# Patient Record
Sex: FEMALE | Race: WHITE | NOT HISPANIC OR LATINO | Employment: PART TIME | URBAN - NONMETROPOLITAN AREA
[De-identification: names, ages, dates, MRNs, and addresses within clinical notes are randomized per-mention and may not be internally consistent; named-entity substitution may affect disease eponyms.]

---

## 2017-02-21 ENCOUNTER — OFFICE VISIT (OUTPATIENT)
Dept: GASTROENTEROLOGY | Facility: CLINIC | Age: 62
End: 2017-02-21

## 2017-02-21 VITALS
HEART RATE: 61 BPM | DIASTOLIC BLOOD PRESSURE: 81 MMHG | HEIGHT: 60 IN | WEIGHT: 169.2 LBS | BODY MASS INDEX: 33.22 KG/M2 | SYSTOLIC BLOOD PRESSURE: 122 MMHG

## 2017-02-21 DIAGNOSIS — R10.13 EPIGASTRIC PAIN: Primary | ICD-10-CM

## 2017-02-21 PROCEDURE — 99204 OFFICE O/P NEW MOD 45 MIN: CPT | Performed by: NURSE PRACTITIONER

## 2017-02-21 RX ORDER — DEXTROSE AND SODIUM CHLORIDE 5; .45 G/100ML; G/100ML
30 INJECTION, SOLUTION INTRAVENOUS CONTINUOUS PRN
Status: CANCELLED | OUTPATIENT
Start: 2017-02-21

## 2017-03-02 RX ORDER — MELATONIN
1000 DAILY
Status: ON HOLD | COMMUNITY
End: 2017-03-09

## 2017-03-09 ENCOUNTER — HOSPITAL ENCOUNTER (OUTPATIENT)
Facility: HOSPITAL | Age: 62
Setting detail: HOSPITAL OUTPATIENT SURGERY
Discharge: HOME | End: 2017-03-09
Attending: INTERNAL MEDICINE | Admitting: INTERNAL MEDICINE

## 2017-03-09 ENCOUNTER — ANESTHESIA EVENT (OUTPATIENT)
Dept: GASTROENTEROLOGY | Facility: HOSPITAL | Age: 62
End: 2017-03-09

## 2017-03-09 ENCOUNTER — ANESTHESIA (OUTPATIENT)
Dept: GASTROENTEROLOGY | Facility: HOSPITAL | Age: 62
End: 2017-03-09

## 2017-03-09 VITALS
RESPIRATION RATE: 16 BRPM | BODY MASS INDEX: 33.07 KG/M2 | TEMPERATURE: 97.6 F | WEIGHT: 168.43 LBS | SYSTOLIC BLOOD PRESSURE: 114 MMHG | HEART RATE: 88 BPM | DIASTOLIC BLOOD PRESSURE: 71 MMHG | HEIGHT: 60 IN | OXYGEN SATURATION: 96 %

## 2017-03-09 DIAGNOSIS — R10.13 EPIGASTRIC PAIN: ICD-10-CM

## 2017-03-09 PROCEDURE — 88305 TISSUE EXAM BY PATHOLOGIST: CPT | Performed by: PATHOLOGY

## 2017-03-09 PROCEDURE — 25010000002 MIDAZOLAM PER 1 MG: Performed by: NURSE ANESTHETIST, CERTIFIED REGISTERED

## 2017-03-09 PROCEDURE — 25010000002 FENTANYL CITRATE (PF) 100 MCG/2ML SOLUTION: Performed by: NURSE ANESTHETIST, CERTIFIED REGISTERED

## 2017-03-09 PROCEDURE — 88342 IMHCHEM/IMCYTCHM 1ST ANTB: CPT | Performed by: INTERNAL MEDICINE

## 2017-03-09 PROCEDURE — 25010000002 PROPOFOL 10 MG/ML EMULSION: Performed by: NURSE ANESTHETIST, CERTIFIED REGISTERED

## 2017-03-09 PROCEDURE — 43239 EGD BIOPSY SINGLE/MULTIPLE: CPT | Performed by: INTERNAL MEDICINE

## 2017-03-09 PROCEDURE — 88342 IMHCHEM/IMCYTCHM 1ST ANTB: CPT | Performed by: PATHOLOGY

## 2017-03-09 PROCEDURE — 88305 TISSUE EXAM BY PATHOLOGIST: CPT | Performed by: INTERNAL MEDICINE

## 2017-03-09 RX ORDER — FENTANYL CITRATE 50 UG/ML
INJECTION, SOLUTION INTRAMUSCULAR; INTRAVENOUS AS NEEDED
Status: DISCONTINUED | OUTPATIENT
Start: 2017-03-09 | End: 2017-03-09 | Stop reason: SURG

## 2017-03-09 RX ORDER — MIDAZOLAM HYDROCHLORIDE 1 MG/ML
INJECTION INTRAMUSCULAR; INTRAVENOUS AS NEEDED
Status: DISCONTINUED | OUTPATIENT
Start: 2017-03-09 | End: 2017-03-09 | Stop reason: SURG

## 2017-03-09 RX ORDER — DEXTROSE AND SODIUM CHLORIDE 5; .45 G/100ML; G/100ML
30 INJECTION, SOLUTION INTRAVENOUS CONTINUOUS PRN
Status: DISCONTINUED | OUTPATIENT
Start: 2017-03-09 | End: 2017-03-09 | Stop reason: HOSPADM

## 2017-03-09 RX ORDER — SODIUM CHLORIDE, SODIUM GLUCONATE, SODIUM ACETATE, POTASSIUM CHLORIDE, AND MAGNESIUM CHLORIDE 526; 502; 368; 37; 30 MG/100ML; MG/100ML; MG/100ML; MG/100ML; MG/100ML
INJECTION, SOLUTION INTRAVENOUS CONTINUOUS PRN
Status: DISCONTINUED | OUTPATIENT
Start: 2017-03-09 | End: 2017-03-09 | Stop reason: SURG

## 2017-03-09 RX ORDER — PROPOFOL 10 MG/ML
VIAL (ML) INTRAVENOUS AS NEEDED
Status: DISCONTINUED | OUTPATIENT
Start: 2017-03-09 | End: 2017-03-09 | Stop reason: SURG

## 2017-03-09 RX ORDER — LIDOCAINE HYDROCHLORIDE 10 MG/ML
INJECTION, SOLUTION INFILTRATION; PERINEURAL AS NEEDED
Status: DISCONTINUED | OUTPATIENT
Start: 2017-03-09 | End: 2017-03-09 | Stop reason: SURG

## 2017-03-09 RX ADMIN — FENTANYL CITRATE 100 MCG: 50 INJECTION, SOLUTION INTRAMUSCULAR; INTRAVENOUS at 09:31

## 2017-03-09 RX ADMIN — PROPOFOL 30 MG: 10 INJECTION, EMULSION INTRAVENOUS at 09:37

## 2017-03-09 RX ADMIN — PROPOFOL 20 MG: 10 INJECTION, EMULSION INTRAVENOUS at 09:38

## 2017-03-09 RX ADMIN — LIDOCAINE HYDROCHLORIDE 80 MG: 10 INJECTION, SOLUTION INFILTRATION; PERINEURAL at 09:31

## 2017-03-09 RX ADMIN — MIDAZOLAM 2 MG: 1 INJECTION INTRAMUSCULAR; INTRAVENOUS at 09:30

## 2017-03-09 RX ADMIN — DEXTROSE AND SODIUM CHLORIDE 30 ML/HR: 5; 450 INJECTION, SOLUTION INTRAVENOUS at 08:57

## 2017-03-09 RX ADMIN — SODIUM CHLORIDE, SODIUM GLUCONATE, SODIUM ACETATE, POTASSIUM CHLORIDE, AND MAGNESIUM CHLORIDE: 526; 502; 368; 37; 30 INJECTION, SOLUTION INTRAVENOUS at 09:22

## 2017-03-09 NOTE — PLAN OF CARE
Problem: Patient Care Overview (Adult)  Goal: Plan of Care Review  Outcome: Ongoing (interventions implemented as appropriate)    03/09/17 0947   Coping/Psychosocial Response Interventions   Plan Of Care Reviewed With patient   Patient Care Overview   Progress no change   Outcome Evaluation   Outcome Summary/Follow up Plan vss         Problem: GI Endoscopy (Adult)  Goal: Signs and Symptoms of Listed Potential Problems Will be Absent or Manageable (GI Endoscopy)  Outcome: Ongoing (interventions implemented as appropriate)    03/09/17 0947   GI Endoscopy   Problems Assessed (GI Endoscopy) all   Problems Present (GI Endoscopy) none

## 2017-03-09 NOTE — ANESTHESIA PREPROCEDURE EVALUATION
Anesthesia Evaluation     Patient summary reviewed and Nursing notes reviewed      Airway   Mallampati: II  TM distance: <3 FB  Neck ROM: full  possible difficult intubation  Dental      Pulmonary - negative pulmonary ROS and normal exam   (-) asthma, recent URI  Cardiovascular - normal exam    (+) hypertension well controlled,       Neuro/Psych  (+) dizziness/light headedness (Vertigo),    GI/Hepatic/Renal/Endo    (+) obesity,    (-) diabetes    Musculoskeletal (-) negative ROS    Abdominal  - normal exam   Substance History - negative use     OB/GYN negative ob/gyn ROS         Other - negative ROS                                   Anesthesia Plan    ASA 2     MAC     intravenous induction   Anesthetic plan and risks discussed with patient.

## 2017-03-09 NOTE — H&P (VIEW-ONLY)
Chief Complaint   Patient presents with   • Abdominal Pain       Subjective    Gabriela Celaya is a 62 y.o. female. she is being seen for consultation today at the request of Dr. Jesus  Abdominal Pain   This is a recurrent problem. The current episode started 1 to 4 weeks ago. The onset quality is gradual. The problem occurs intermittently. The pain is located in the epigastric region. The pain is mild. The quality of the pain is aching. The abdominal pain does not radiate. Associated symptoms include diarrhea. Pertinent negatives include no arthralgias, constipation, fever, flatus, hematochezia, melena, nausea, vomiting or weight loss. Associated symptoms comments: chills. The pain is relieved by bowel movements. The treatment provided moderate relief. There is no history of abdominal surgery, colon cancer, gallstones, GERD, pancreatitis or ulcerative colitis.     Patient presents to discuss severe epigastric pain.  States it's improved currently.  However reports she'll have intermittent attacks with severe pain.  Cannot pinpoint any certain intake or activity.  Denies any current nausea vomiting.  States she will have diarrhea in the early morning.  Denies any melena or hematochezia.  States she's been on Nexium which has helped some.  Had an ultrasound of her abdomen which noted contracted gallbladder with no stones or pericholecystic fluid.  states whenever attacks occur they will last about 4-5 hours described the pain is dull and sharp.  Weight is up about 8 pounds.  Plan; schedule HIDA scan and EGD to evaluate.  Follow-up after test.      The following portions of the patient's history were reviewed and updated as appropriate:   Past Medical History   Diagnosis Date   • Acute bronchitis    • Acute pharyngitis    • Allergic asthma      IgE-mediated allergic asthma      • Allergic rhinitis    • Asthmatic bronchitis    • Cough    • Diabetes mellitus screening    • Dizziness and giddiness    • Encounter for  long-term current use of medication    • Essential hypertension    • Fatigue    • History of bone density study      DEXA (bone density) test, peripheral   • History of mammography, diagnostic    • Nuclear senile cataract    • Otalgia    • Shoulder pain      bursitis vs tendonitis      • Sprain of ankle, left    • Upper respiratory infection    • Vertigo    • Wheezing      Past Surgical History   Procedure Laterality Date   • Pelvic fracture surgery  1973     Anesth, repair fracture, pelvis   • Cardiac catheterization  04/16/2008     Cardiac cath (negative)   • Injection of medication  03/05/2013   • Pap smear     • Tubal abdominal ligation       Family History   Problem Relation Age of Onset   • Asthma Mother    • Diabetes Mother    • Stroke Mother      cva in her 50's   • Cancer Father    • Diabetes Father    • Heart attack Father      mi in 50's   • No Known Problems Son      OB History     No data available        Current Outpatient Prescriptions   Medication Sig Dispense Refill   • aspirin 81 MG tablet Take 81 mg by mouth Daily.     • lisinopril (PRINIVIL,ZESTRIL) 10 MG tablet Take 10 mg by mouth Daily.     • Multiple Vitamins-Minerals (MULTIVITAMIN PO) Take 1 tablet by mouth Daily.     • cholecalciferol (VITAMIN D3) 1000 UNITS tablet Take 1,000 Units by mouth Daily.       No current facility-administered medications for this visit.      No Known Allergies  Social History     Social History   • Marital status:      Spouse name: N/A   • Number of children: N/A   • Years of education: N/A     Social History Main Topics   • Smoking status: Never Smoker   • Smokeless tobacco: Never Used   • Alcohol use No   • Drug use: No   • Sexual activity: Defer     Other Topics Concern   • Not on file     Social History Narrative       Review of Systems  Review of Systems   Constitutional: Negative for activity change, appetite change, chills, diaphoresis, fatigue, fever, unexpected weight change and weight loss.   HENT:  "Negative for sore throat and trouble swallowing.    Respiratory: Negative for shortness of breath.    Gastrointestinal: Positive for diarrhea. Negative for abdominal distention, abdominal pain, anal bleeding, blood in stool, constipation, flatus, hematochezia, melena, nausea, rectal pain and vomiting.   Musculoskeletal: Negative for arthralgias.   Skin: Negative for pallor.   Neurological: Negative for light-headedness.        Visit Vitals   • /81   • Pulse 61   • Ht 60\" (152.4 cm)   • Wt 169 lb 3.2 oz (76.7 kg)   • BMI 33.04 kg/m2       Objective    Physical Exam   Constitutional: She is oriented to person, place, and time. She appears well-developed and well-nourished. She is cooperative. No distress.   HENT:   Head: Normocephalic and atraumatic.   Neck: Normal range of motion. Neck supple. No thyromegaly present.   Cardiovascular: Normal rate, regular rhythm and normal heart sounds.    Pulmonary/Chest: Effort normal and breath sounds normal. She has no wheezes. She has no rhonchi. She has no rales.   Abdominal: Soft. Normal appearance and bowel sounds are normal. She exhibits no shifting dullness, no distension and no fluid wave. There is no hepatosplenomegaly. There is no tenderness. There is no rigidity and no guarding. No hernia.   Lymphadenopathy:     She has no cervical adenopathy.   Neurological: She is alert and oriented to person, place, and time.   Skin: Skin is warm, dry and intact. No rash noted. No pallor.   Psychiatric: She has a normal mood and affect. Her speech is normal.     Hospital Outpatient Visit on 03/29/2015   Component Date Value Ref Range Status   • WBC 03/29/2015 9.9* 3.2 - 9.8 x1000/uL Final   • RBC 03/29/2015 3.90  3.77 - 5.16 eugene/mm3 Final   • Hemoglobin 03/29/2015 12.4  12.0 - 15.5 gm/dl Final   • Hematocrit 03/29/2015 35.6  35.0 - 45.0 % Final   • MCV 03/29/2015 91.3  80.0 - 98.0 fl Final   • MCH 03/29/2015 31.8  26.0 - 34.0 pg Final   • MCHC 03/29/2015 34.8  31.4 - 36.0 " gm/dl Final   • RDW 03/29/2015 12.7  11.5 - 14.5 % Final   • Platelets 03/29/2015 277  150 - 450 x1000/mm3 Final   • MPV 03/29/2015 9.5  8.0 - 12.0 fl Final   • Neutrophil Rel % 03/29/2015 83.4* 37.0 - 80.0 % Final   • Lymphocyte Rel % 03/29/2015 11.3  10.0 - 50.0 % Final   • Monocyte Rel % 03/29/2015 4.7  0.0 - 12.0 % Final   • Eosinophil Rel % 03/29/2015 0.3  0.0 - 7.0 % Final   • Basophil Rel % 03/29/2015 0.1  0.0 - 2.0 % Final   • Immature Granulocyte Rel % 03/29/2015 0.20  0.00 - 0.50 % Final   • Neutrophils Absolute 03/29/2015 8.22  2.00 - 8.60 x1000/uL Final   • Lymphocytes Absolute 03/29/2015 1.11  0.60 - 4.20 x1000/uL Final   • Monocytes Absolute 03/29/2015 0.46  0.00 - 0.90 x1000/uL Final   • Eosinophils Absolute 03/29/2015 0.03  0.00 - 0.70 x1000/uL Final   • Basophils Absolute 03/29/2015 0.01  0.00 - 0.20 x1000/uL Final   • Immature Granulocytes Absolute 03/29/2015 0.020  0.005 - 0.022 x1000/uL Final   • TSH 03/29/2015 1.67  0.46 - 4.68 uIU/ml Final   • NT-proBNP 03/29/2015 65  0 - 900 pg/ml Final   • D-Dimer, Quant 03/29/2015 535* 0 - 470 ng/ml FEU Final    Comment: DF by IF @ 03/29/2015 20:01  Dimer values <500 ng/ml FEU are FDA approved as aid in diagnosis of deep venous thrombosis and pulmonary embolism.  This test should not be used in an exclusion strategy with pretest probability alone.     • Sodium 03/29/2015 138  137 - 145 mmol/L Final   • Potassium 03/29/2015 3.6  3.5 - 5.1 mmol/L Final   • Chloride 03/29/2015 102  95 - 110 mmol/L Final   • CO2 03/29/2015 24  22 - 31 mmol/L Final   • Anion Gap 03/29/2015 12.0  5.0 - 15.0 mmol/L Final   • Glucose 03/29/2015 151* 60 - 100 mg/dl Final   • BUN 03/29/2015 11  7 - 21 mg/dl Final   • Creatinine 03/29/2015 0.7  0.5 - 1.0 mg/dl Final   • GFR MDRD Non  03/29/2015 86  51 - 120 mL/min/1.73 sq.M Final    Comment: Invalid if creatinine is changing or the patient is on dialysis.  Use AA result if patient is -American, non AA result  otherwise.     • GFR MDRD  03/29/2015 104  51 - 120 mL/min/1.73 sq.M Final   • Calcium 03/29/2015 9.4  8.4 - 10.2 mg/dl Final   • Total Protein 03/29/2015 7.2  6.3 - 8.6 gm/dl Final   • Albumin 03/29/2015 4.4  3.4 - 4.8 gm/dl Final   • Total Bilirubin 03/29/2015 0.4  0.2 - 1.3 mg/dl Final   • Alkaline Phosphatase 03/29/2015 72  38 - 126 U/L Final   • AST (SGOT) 03/29/2015 30  14 - 36 U/L Final   • ALT (SGPT) 03/29/2015 27  9 - 52 U/L Final   • Creatine Kinase 03/29/2015 57  30 - 135 U/L Final   • CKMB 03/29/2015 0.9  0.0 - 5.0 ng/ml Final   • Troponin I 03/29/2015 <0.012  0.000 - 0.034 ng/ml Final   • Creatine Kinase 03/29/2015 51  30 - 135 U/L Final   • CKMB 03/29/2015 0.7  0.0 - 5.0 ng/ml Final   • Troponin I 03/29/2015 <0.012  0.000 - 0.034 ng/ml Final   • Color, UA 03/29/2015 Yellow   Final   • Appearance 03/29/2015 Slightly Cloudy   Final   • Specific Gravity, UA 03/29/2015 1.025  1.003 - 1.030 Final   • pH, UA 03/29/2015 5.5  pH Units Final    Comment: DF by IF @ 03/29/2015 19:43  pH Normal: 5.0 - 9.0      • Leukocytes, UA 03/29/2015 NEGATIVE  NEGATIVE Final   • Nitrite, UA 03/29/2015 NEGATIVE  NEGATIVE Final   • Protein, UA 03/29/2015 NEGATIVE  NEGATIVE Final   • Glucose, Urine 03/29/2015 NEGATIVE  NEGATIVE mg/dl Final   • Ketones, UA 03/29/2015 NEGATIVE  NEGATIVE Final   • Urobilinogen, UA 03/29/2015 0.2  0.2 EU/dl Final   • Blood, UA 03/29/2015 2+* NEGATIVE Final   • WBC, UA 03/29/2015 3-5  0 - 5  /HPF Final   • RBC, UA 03/29/2015 20-30* 0 - 2  /HPF Final   • Bacteria, UA 03/29/2015 TRACE* 0  /HPF Final   • Mucus, UA 03/29/2015 1+   Final   • Epithelial Cells, UA 03/29/2015 1+ Squamous   /HPF Final     Assessment/Plan      1. Epigastric pain    .       Orders placed during this encounter include:  Orders Placed This Encounter   Procedures   • NM HIDA Scan With Pharmacological Intervention     Order Specific Question:   Reason for Exam:     Answer:   Epigastric pain     Order Specific  Question:   Is the patient pregnant?     Answer:   Unknown     Order Specific Question:   Is the patient breastfeeding?     Answer:   No       ESOPHAGOGASTRODUODENOSCOPY possible dilation  (N/A)    Review and/or summary of lab tests, radiology, procedures, medications. Review and summary of old records and obtaining of history. The risks and benefits of my recommendations, as well as other treatment options were discussed with the patient today. Questions were answered.        Follow-up: Return in about 4 weeks (around 3/21/2017).          This document has been electronically signed by KAYLI Soto on March 6, 2017 9:00 AM             Results for orders placed or performed during the hospital encounter of 03/29/15   proBNP   Result Value Ref Range    NT-proBNP 65 0 - 900 pg/ml   Urinalysis, Microscopic only   Result Value Ref Range    WBC, UA 3-5 0 - 5  /HPF    RBC, UA 20-30 (A) 0 - 2  /HPF    Bacteria, UA TRACE (A) 0  /HPF    Mucus, UA 1+     Epithelial Cells, UA 1+ Squamous  /HPF   CK MB   Result Value Ref Range    CKMB 0.7 0.0 - 5.0 ng/ml   CK MB   Result Value Ref Range    CKMB 0.9 0.0 - 5.0 ng/ml   Troponin   Result Value Ref Range    Troponin I <0.012 0.000 - 0.034 ng/ml   Troponin   Result Value Ref Range    Troponin I <0.012 0.000 - 0.034 ng/ml   Urinalysis Without Microscopic   Result Value Ref Range    Color, UA Yellow     Appearance Slightly Cloudy     Specific Gravity, UA 1.025 1.003 - 1.030    pH, UA 5.5 pH Units    Leukocytes, UA NEGATIVE NEGATIVE    Nitrite, UA NEGATIVE NEGATIVE    Protein, UA NEGATIVE NEGATIVE    Glucose, Urine NEGATIVE NEGATIVE mg/dl    Ketones, UA NEGATIVE NEGATIVE    Urobilinogen, UA 0.2 0.2 EU/dl    Blood, UA 2+ (A) NEGATIVE   D-dimer, quantitative   Result Value Ref Range    D-Dimer, Quant 535 (H) 0 - 470 ng/ml FEU   CBC and Differential   Result Value Ref Range    WBC 9.9 (H) 3.2 - 9.8 x1000/uL    RBC 3.90 3.77 - 5.16 eugene/mm3    Hemoglobin 12.4 12.0 - 15.5 gm/dl     Hematocrit 35.6 35.0 - 45.0 %    MCV 91.3 80.0 - 98.0 fl    MCH 31.8 26.0 - 34.0 pg    MCHC 34.8 31.4 - 36.0 gm/dl    RDW 12.7 11.5 - 14.5 %    Platelets 277 150 - 450 x1000/mm3    MPV 9.5 8.0 - 12.0 fl    Neutrophil Rel % 83.4 (H) 37.0 - 80.0 %    Lymphocyte Rel % 11.3 10.0 - 50.0 %    Monocyte Rel % 4.7 0.0 - 12.0 %    Eosinophil Rel % 0.3 0.0 - 7.0 %    Basophil Rel % 0.1 0.0 - 2.0 %    Immature Granulocyte Rel % 0.20 0.00 - 0.50 %    Neutrophils Absolute 8.22 2.00 - 8.60 x1000/uL    Lymphocytes Absolute 1.11 0.60 - 4.20 x1000/uL    Monocytes Absolute 0.46 0.00 - 0.90 x1000/uL    Eosinophils Absolute 0.03 0.00 - 0.70 x1000/uL    Basophils Absolute 0.01 0.00 - 0.20 x1000/uL    Immature Granulocytes Absolute 0.020 0.005 - 0.022 x1000/uL   TSH   Result Value Ref Range    TSH 1.67 0.46 - 4.68 uIU/ml   CK   Result Value Ref Range    Creatine Kinase 51 30 - 135 U/L   CK   Result Value Ref Range    Creatine Kinase 57 30 - 135 U/L   Comprehensive metabolic panel   Result Value Ref Range    Sodium 138 137 - 145 mmol/L    Potassium 3.6 3.5 - 5.1 mmol/L    Chloride 102 95 - 110 mmol/L    CO2 24 22 - 31 mmol/L    Anion Gap 12.0 5.0 - 15.0 mmol/L    Glucose 151 (H) 60 - 100 mg/dl    BUN 11 7 - 21 mg/dl    Creatinine 0.7 0.5 - 1.0 mg/dl    GFR MDRD Non  86 51 - 120 mL/min/1.73 sq.M    GFR MDRD  104 51 - 120 mL/min/1.73 sq.M    Calcium 9.4 8.4 - 10.2 mg/dl    Total Protein 7.2 6.3 - 8.6 gm/dl    Albumin 4.4 3.4 - 4.8 gm/dl    Total Bilirubin 0.4 0.2 - 1.3 mg/dl    Alkaline Phosphatase 72 38 - 126 U/L    AST (SGOT) 30 14 - 36 U/L    ALT (SGPT) 27 9 - 52 U/L

## 2017-03-09 NOTE — ANESTHESIA POSTPROCEDURE EVALUATION
Patient: Gabriela Celaya    Procedure Summary     Date Anesthesia Start Anesthesia Stop Room / Location    03/09/17 0930 0947 [facility] ENDOSCOPY 1 / [facility] ENDOSCOPY       Procedure Diagnosis Surgeon Provider    ESOPHAGOGASTRODUODENOSCOPY possible dilation  (N/A Esophagus) Epigastric pain  (Epigastric pain [R10.13]) MD Elissa Rdz CRNA          Anesthesia Type: MAC  Last vitals  /79 (03/09/17 0852)    Temp 98.1 °F (36.7 °C) (03/09/17 0852)    Pulse 80 (03/09/17 0852)   Resp 18 (03/09/17 0852)    SpO2 97 % (03/09/17 0852)      Post Anesthesia Care and Evaluation    Patient location during evaluation: bedside  Patient participation: complete - patient participated  Level of consciousness: awake and awake and alert  Pain management: adequate  Airway patency: patent  Anesthetic complications: No anesthetic complications    Cardiovascular status: acceptable  Respiratory status: acceptable  Hydration status: acceptable

## 2017-03-10 LAB
LAB AP CASE REPORT: NORMAL
LAB AP DIAGNOSIS COMMENT: NORMAL
Lab: NORMAL
PATH REPORT.FINAL DX SPEC: NORMAL
PATH REPORT.GROSS SPEC: NORMAL

## 2017-03-17 ENCOUNTER — HOSPITAL ENCOUNTER (OUTPATIENT)
Dept: NUCLEAR MEDICINE | Facility: HOSPITAL | Age: 62
Discharge: HOME | End: 2017-03-17

## 2017-03-17 PROCEDURE — A9537 TC99M MEBROFENIN: HCPCS | Performed by: NURSE PRACTITIONER

## 2017-03-17 PROCEDURE — 0 TECHNETIUM TC 99M MEBROFENIN KIT: Performed by: NURSE PRACTITIONER

## 2017-03-17 PROCEDURE — 78227 HEPATOBIL SYST IMAGE W/DRUG: CPT

## 2017-03-17 PROCEDURE — 25010000002 SINCALIDE PER 5 MCG: Performed by: NURSE PRACTITIONER

## 2017-03-17 RX ORDER — KIT FOR THE PREPARATION OF TECHNETIUM TC 99M MEBROFENIN 45 MG/10ML
1 INJECTION, POWDER, LYOPHILIZED, FOR SOLUTION INTRAVENOUS
Status: COMPLETED | OUTPATIENT
Start: 2017-03-17 | End: 2017-03-17

## 2017-03-17 RX ADMIN — MEBROFENIN 1 DOSE: 45 INJECTION, POWDER, LYOPHILIZED, FOR SOLUTION INTRAVENOUS at 09:21

## 2017-03-17 RX ADMIN — SINCALIDE 1.5 MCG: 5 INJECTION, POWDER, LYOPHILIZED, FOR SOLUTION INTRAVENOUS at 10:26

## 2017-03-30 ENCOUNTER — TELEPHONE (OUTPATIENT)
Dept: GASTROENTEROLOGY | Facility: CLINIC | Age: 62
End: 2017-03-30

## 2017-03-30 NOTE — TELEPHONE ENCOUNTER
03/30/2017, 1236 - Patient telephoned per this staff member [phone] with notification per Dr. White regarding EGD pathology performed 03/09/2017 - Biopsies negative; Ensure patient has clinical appointment with KAYLI Simmons.  Patient verbalized understanding and given reminder of clinical appointment with KAYLI Simmons scheduled Thursday, April 20, 2017 at 11:15 a.m.

## 2017-03-30 NOTE — TELEPHONE ENCOUNTER
----- Message from Prince White MD sent at 3/27/2017  3:44 PM CDT -----  Biopsies were negative. Follow-up with KAYLI Posadas

## 2017-04-20 ENCOUNTER — OFFICE VISIT (OUTPATIENT)
Dept: GASTROENTEROLOGY | Facility: CLINIC | Age: 62
End: 2017-04-20

## 2017-04-20 VITALS
SYSTOLIC BLOOD PRESSURE: 113 MMHG | HEIGHT: 60 IN | BODY MASS INDEX: 33.12 KG/M2 | WEIGHT: 168.7 LBS | HEART RATE: 60 BPM | DIASTOLIC BLOOD PRESSURE: 74 MMHG

## 2017-04-20 DIAGNOSIS — R10.13 EPIGASTRIC PAIN: ICD-10-CM

## 2017-04-20 DIAGNOSIS — K21.9 GASTROESOPHAGEAL REFLUX DISEASE WITHOUT ESOPHAGITIS: Primary | ICD-10-CM

## 2017-04-20 PROCEDURE — 99214 OFFICE O/P EST MOD 30 MIN: CPT | Performed by: NURSE PRACTITIONER

## 2017-04-20 RX ORDER — ERGOCALCIFEROL 1.25 MG/1
50000 CAPSULE ORAL
COMMUNITY
Start: 2017-04-12

## 2017-04-20 RX ORDER — OMEPRAZOLE 40 MG/1
40 CAPSULE, DELAYED RELEASE ORAL DAILY
Qty: 90 CAPSULE | Refills: 3 | Status: SHIPPED | OUTPATIENT
Start: 2017-04-20 | End: 2017-08-26

## 2017-04-20 NOTE — PROGRESS NOTES
Chief Complaint   Patient presents with   • EGD     follow up       Subjective    Gabriela Celaya is a 62 y.o. female. she is being seen for follow-up.  nAbdominal Pain   This is a recurrent problem. The current episode started 1 to 4 weeks ago. The onset quality is gradual. The problem occurs intermittently. The pain is located in the epigastric region. The pain is mild. The quality of the pain is aching. The abdominal pain does not radiate. Pertinent negatives include no arthralgias, constipation, diarrhea, fever, flatus, hematochezia, melena, nausea, vomiting or weight loss. Associated symptoms comments: chills. The pain is relieved by bowel movements. The treatment provided moderate relief. There is no history of abdominal surgery, colon cancer, gallstones, GERD, pancreatitis or ulcerative colitis.     Patient presents to discuss EGD results.  States it's improved currently.  However reports she'll have intermittent attacks with severe pain.  Cannot pinpoint any certain intake or activity.  Denies any current nausea vomiting. States pain has occurred less frequently, but will last about three hours and improve with otc medication such as pepto bismol.  States she will have diarrhea in the early morning.  Denies any melena or hematochezia.  States she's been on Nexium which has helped some, but she does not take everyday.  Had an ultrasound of her abdomen which noted contracted gallbladder with no stones or pericholecystic fluid.  Weight is down 2 pounds.  HIDA normal ejection fraction of 99%  EGD noted normal esophagus.  Scattered inflammation characterized by erythema was found the gastric antrum.  Duodenum was normal.  Biopsy of the duodenum noted unremarkable tall villous mucosa of the duodenum.  Negative for parasites area antrum of stomach biopsy noted mild chronic gastritis with benign lymphoid aggregates and congestion.  Negative for H. pylori.  Start Prilosec 40 mg per day.  Follow-up in 6 months  return to office sooner if needed.    The following portions of the patient's history were reviewed and updated as appropriate:   Past Medical History:   Diagnosis Date   • Acute bronchitis    • Acute pharyngitis    • Allergic asthma     IgE-mediated allergic asthma      • Allergic rhinitis    • Asthmatic bronchitis    • Cough    • Diabetes mellitus screening    • Dizziness and giddiness    • Encounter for long-term current use of medication    • Essential hypertension    • Fatigue    • History of bone density study     DEXA (bone density) test, peripheral   • History of mammography, diagnostic    • Nuclear senile cataract    • Otalgia    • Shoulder pain     bursitis vs tendonitis      • Sprain of ankle, left    • Upper respiratory infection    • Vertigo    • Wheezing      Past Surgical History:   Procedure Laterality Date   • CARDIAC CATHETERIZATION  04/16/2008    Cardiac cath (negative)   • ENDOSCOPY N/A 3/9/2017    Procedure: ESOPHAGOGASTRODUODENOSCOPY possible dilation ;  Surgeon: Prince White MD;  Location: [facility] ENDOSCOPY;  Service:    • INJECTION OF MEDICATION  03/05/2013   • PAP SMEAR     • PELVIC FRACTURE SURGERY  1973    Anesth, repair fracture, pelvis   • TUBAL ABDOMINAL LIGATION       Family History   Problem Relation Age of Onset   • Asthma Mother    • Diabetes Mother    • Stroke Mother      cva in her 50's   • Cancer Father    • Diabetes Father    • Heart attack Father      mi in 50's   • No Known Problems Son      OB History     No data available        Current Outpatient Prescriptions   Medication Sig Dispense Refill   • aspirin 81 MG tablet Take 81 mg by mouth Daily.     • lisinopril (PRINIVIL,ZESTRIL) 10 MG tablet Take 10 mg by mouth Daily.     • vitamin D (ERGOCALCIFEROL) 48803 UNITS capsule capsule      • omeprazole (priLOSEC) 40 MG capsule Take 1 capsule by mouth Daily. 90 capsule 3     No current facility-administered medications for this visit.      No Known Allergies  Social History  "    Social History   • Marital status:      Spouse name: N/A   • Number of children: N/A   • Years of education: N/A     Social History Main Topics   • Smoking status: Never Smoker   • Smokeless tobacco: Never Used   • Alcohol use No   • Drug use: No   • Sexual activity: Defer     Other Topics Concern   • Not on file     Social History Narrative       Review of Systems  Review of Systems   Constitutional: Negative for activity change, appetite change, chills, diaphoresis, fatigue, fever, unexpected weight change and weight loss.   HENT: Negative for sore throat and trouble swallowing.    Respiratory: Negative for shortness of breath.    Gastrointestinal: Positive for abdominal pain (epigastric dull ache). Negative for abdominal distention, anal bleeding, blood in stool, constipation, diarrhea, flatus, hematochezia, melena, nausea, rectal pain and vomiting.   Musculoskeletal: Negative for arthralgias.   Skin: Negative for pallor.   Neurological: Negative for light-headedness.        /74  Pulse 60  Ht 60\" (152.4 cm)  Wt 168 lb 11.2 oz (76.5 kg)  BMI 32.95 kg/m2    Objective    Physical Exam   Constitutional: She is oriented to person, place, and time. She appears well-developed and well-nourished. She is cooperative. No distress.   HENT:   Head: Normocephalic and atraumatic.   Neck: Normal range of motion. Neck supple. No thyromegaly present.   Cardiovascular: Normal rate, regular rhythm and normal heart sounds.    Pulmonary/Chest: Effort normal and breath sounds normal. She has no wheezes. She has no rhonchi. She has no rales.   Abdominal: Soft. Normal appearance and bowel sounds are normal. She exhibits no shifting dullness, no distension and no fluid wave. There is no hepatosplenomegaly. There is no tenderness. There is no rigidity and no guarding. No hernia.   Lymphadenopathy:     She has no cervical adenopathy.   Neurological: She is alert and oriented to person, place, and time.   Skin: Skin is " warm, dry and intact. No rash noted. No pallor.   Psychiatric: She has a normal mood and affect. Her speech is normal.     Admission on 03/09/2017, Discharged on 03/09/2017   Component Date Value Ref Range Status   • Case Report 03/09/2017    Final                    Value:Surgical Pathology Report                         Case: [id number]                                  Authorizing Provider:  Prince White MD    Collected:           03/09/2017 09:47 AM          Ordering Location:     [facility]             Received:            03/09/2017 02:36 PM                                 [facility]                                                     Pathologist:           Pranav Rodriguez MD                                                          Specimens:   1) - Small Intestine, Duodenum, biopsy                                                              2) - Gastric, Antrum, biopsy                                                              • Final Diagnosis 03/09/2017    Final                    Value:This result contains rich text formatting which cannot be displayed here.   • Comment 03/09/2017    Final                    Value:This result contains rich text formatting which cannot be displayed here.   • Gross Description 03/09/2017    Final                    Value:This result contains rich text formatting which cannot be displayed here.     Assessment/Plan      1. Gastroesophageal reflux disease without esophagitis    2. Epigastric pain    .     Orders placed during this encounter include:  Prilosec 40 mg per day.   Review and/or summary of lab tests, radiology, procedures, medications. Review and summary of old records and obtaining of history. The risks and benefits of my recommendations, as well as other treatment options were discussed with the patient today. Questions were answered.        Follow-up: Return in about 6 months (around 10/20/2017).          This document has been  "electronically signed by KAYLI Soto on April 20, 2017 5:11 PM             Results for orders placed or performed during the hospital encounter of 03/09/17   Tissue Exam   Result Value Ref Range    Case Report       Surgical Pathology Report                         Case: [id number]                                  Authorizing Provider:  Prince White MD    Collected:           03/09/2017 09:47 AM          Ordering Location:     [facility]             Received:            03/09/2017 02:36 PM                                 [facility]                                                     Pathologist:           Pranav Rodriguez MD                                                          Specimens:   1) - Small Intestine, Duodenum, biopsy                                                              2) - Gastric, Antrum, biopsy                                                               Final Diagnosis       1.  MUCOSA, DUODENUM:            UNREMARKABLE TALL VILLOUS MUCOSA OF THE DUODENUM.            NEGATIVE FOR PARASITES.    2.  MUCOSA, ANTRUM OF STOMACH:            MILD CHRONIC GASTRITIS WITH BENIGN LYMPHOID AGGREGATES AND CONGESTION.            NEGATIVE FOR HELICOBACTER PYLORI (HP IMMUNOSTAIN).      Comment       HP immunostain was developed and its performance characteristics determined by [facility].  It has not been cleared or approved by the U.S. Food and Drug Administration.  The FDA has determined that such clearance or approval is not necessary.  This test is used for clinical purposes.  It should not be regarded as investigational or for research.  This laboratory is certified under the Clinical Laboratory Improvement Amendments of 1988 (CLIA-88) as qualified to perform high complexity clinical laboratory testing.        Gross Description       1.  The first container is labeled \"duodenum\" and has nodular bits of white soft tissue measuring " "0.5 cc in aggregate.  The entire specimen is embedded as 1A.    2.  The second container is labeled \"antrum of stomach\" and has nodular bits of white soft tissue measuring 0.6 cc in aggregate.  The entire specimen is embedded as 2A.      Embedded Images     Results for orders placed or performed during the hospital encounter of 03/29/15   proBNP   Result Value Ref Range    NT-proBNP 65 0 - 900 pg/ml   Urinalysis, Microscopic only   Result Value Ref Range    WBC, UA 3-5 0 - 5  /HPF    RBC, UA 20-30 (A) 0 - 2  /HPF    Bacteria, UA TRACE (A) 0  /HPF    Mucus, UA 1+     Epithelial Cells, UA 1+ Squamous  /HPF   CK MB   Result Value Ref Range    CKMB 0.7 0.0 - 5.0 ng/ml   CK MB   Result Value Ref Range    CKMB 0.9 0.0 - 5.0 ng/ml   Troponin   Result Value Ref Range    Troponin I <0.012 0.000 - 0.034 ng/ml   Troponin   Result Value Ref Range    Troponin I <0.012 0.000 - 0.034 ng/ml   Urinalysis Without Microscopic   Result Value Ref Range    Color, UA Yellow     Appearance Slightly Cloudy     Specific Gravity, UA 1.025 1.003 - 1.030    pH, UA 5.5 pH Units    Leukocytes, UA NEGATIVE NEGATIVE    Nitrite, UA NEGATIVE NEGATIVE    Protein, UA NEGATIVE NEGATIVE    Glucose, Urine NEGATIVE NEGATIVE mg/dl    Ketones, UA NEGATIVE NEGATIVE    Urobilinogen, UA 0.2 0.2 EU/dl    Blood, UA 2+ (A) NEGATIVE   D-dimer, quantitative   Result Value Ref Range    D-Dimer, Quant 535 (H) 0 - 470 ng/ml FEU   CBC and Differential   Result Value Ref Range    WBC 9.9 (H) 3.2 - 9.8 x1000/uL    RBC 3.90 3.77 - 5.16 eugene/mm3    Hemoglobin 12.4 12.0 - 15.5 gm/dl    Hematocrit 35.6 35.0 - 45.0 %    MCV 91.3 80.0 - 98.0 fl    MCH 31.8 26.0 - 34.0 pg    MCHC 34.8 31.4 - 36.0 gm/dl    RDW 12.7 11.5 - 14.5 %    Platelets 277 150 - 450 x1000/mm3    MPV 9.5 8.0 - 12.0 fl    Neutrophil Rel % 83.4 (H) 37.0 - 80.0 %    Lymphocyte Rel % 11.3 10.0 - 50.0 %    Monocyte Rel % 4.7 0.0 - 12.0 %    Eosinophil Rel % 0.3 0.0 - 7.0 %    Basophil Rel % 0.1 0.0 - 2.0 %    " Immature Granulocyte Rel % 0.20 0.00 - 0.50 %    Neutrophils Absolute 8.22 2.00 - 8.60 x1000/uL    Lymphocytes Absolute 1.11 0.60 - 4.20 x1000/uL    Monocytes Absolute 0.46 0.00 - 0.90 x1000/uL    Eosinophils Absolute 0.03 0.00 - 0.70 x1000/uL    Basophils Absolute 0.01 0.00 - 0.20 x1000/uL    Immature Granulocytes Absolute 0.020 0.005 - 0.022 x1000/uL   TSH   Result Value Ref Range    TSH 1.67 0.46 - 4.68 uIU/ml   CK   Result Value Ref Range    Creatine Kinase 51 30 - 135 U/L   CK   Result Value Ref Range    Creatine Kinase 57 30 - 135 U/L   Comprehensive metabolic panel   Result Value Ref Range    Sodium 138 137 - 145 mmol/L    Potassium 3.6 3.5 - 5.1 mmol/L    Chloride 102 95 - 110 mmol/L    CO2 24 22 - 31 mmol/L    Anion Gap 12.0 5.0 - 15.0 mmol/L    Glucose 151 (H) 60 - 100 mg/dl    BUN 11 7 - 21 mg/dl    Creatinine 0.7 0.5 - 1.0 mg/dl    GFR MDRD Non  86 51 - 120 mL/min/1.73 sq.M    GFR MDRD  104 51 - 120 mL/min/1.73 sq.M    Calcium 9.4 8.4 - 10.2 mg/dl    Total Protein 7.2 6.3 - 8.6 gm/dl    Albumin 4.4 3.4 - 4.8 gm/dl    Total Bilirubin 0.4 0.2 - 1.3 mg/dl    Alkaline Phosphatase 72 38 - 126 U/L    AST (SGOT) 30 14 - 36 U/L    ALT (SGPT) 27 9 - 52 U/L

## 2017-08-26 ENCOUNTER — APPOINTMENT (OUTPATIENT)
Dept: GENERAL RADIOLOGY | Facility: HOSPITAL | Age: 62
End: 2017-08-26

## 2017-08-26 ENCOUNTER — HOSPITAL ENCOUNTER (OUTPATIENT)
Facility: HOSPITAL | Age: 62
Setting detail: OBSERVATION
Discharge: HOME | End: 2017-08-27
Attending: FAMILY MEDICINE | Admitting: FAMILY MEDICINE

## 2017-08-26 ENCOUNTER — APPOINTMENT (OUTPATIENT)
Dept: CT IMAGING | Facility: HOSPITAL | Age: 62
End: 2017-08-26

## 2017-08-26 DIAGNOSIS — R07.89 OTHER CHEST PAIN: Primary | ICD-10-CM

## 2017-08-26 PROBLEM — R07.9 CHEST PAIN: Status: ACTIVE | Noted: 2017-08-26

## 2017-08-26 LAB
ALBUMIN SERPL-MCNC: 4.4 G/DL (ref 3.4–4.8)
ALBUMIN/GLOB SERPL: 1.6 G/DL (ref 1.1–1.8)
ALP SERPL-CCNC: 84 U/L (ref 38–126)
ALT SERPL W P-5'-P-CCNC: 39 U/L (ref 9–52)
AMYLASE SERPL-CCNC: 95 U/L (ref 50–130)
ANION GAP SERPL CALCULATED.3IONS-SCNC: 12 MMOL/L (ref 5–15)
AST SERPL-CCNC: 27 U/L (ref 14–36)
BACTERIA UR QL AUTO: ABNORMAL /HPF
BASOPHILS # BLD AUTO: 0.02 10*3/MM3 (ref 0–0.2)
BASOPHILS NFR BLD AUTO: 0.2 % (ref 0–2)
BILIRUB SERPL-MCNC: 0.6 MG/DL (ref 0.2–1.3)
BILIRUB UR QL STRIP: NEGATIVE
BUN BLD-MCNC: 17 MG/DL (ref 7–21)
BUN/CREAT SERPL: 26.2 (ref 7–25)
CALCIUM SPEC-SCNC: 9.8 MG/DL (ref 8.4–10.2)
CHLORIDE SERPL-SCNC: 101 MMOL/L (ref 95–110)
CK MB SERPL-CCNC: 1.12 NG/ML (ref 0–5)
CK SERPL-CCNC: 67 U/L (ref 30–135)
CLARITY UR: CLEAR
CO2 SERPL-SCNC: 23 MMOL/L (ref 22–31)
COLOR UR: YELLOW
CREAT BLD-MCNC: 0.65 MG/DL (ref 0.5–1)
D-DIMER, QUANTITATIVE (MAD,POW, STR): 457 NG/ML (FEU) (ref 0–470)
DEPRECATED RDW RBC AUTO: 41.9 FL (ref 36.4–46.3)
EOSINOPHIL # BLD AUTO: 0.17 10*3/MM3 (ref 0–0.7)
EOSINOPHIL NFR BLD AUTO: 2.1 % (ref 0–7)
ERYTHROCYTE [DISTWIDTH] IN BLOOD BY AUTOMATED COUNT: 12.5 % (ref 11.5–14.5)
GFR SERPL CREATININE-BSD FRML MDRD: 93 ML/MIN/1.73 (ref 45–104)
GLOBULIN UR ELPH-MCNC: 2.8 GM/DL (ref 2.3–3.5)
GLUCOSE BLD-MCNC: 119 MG/DL (ref 60–100)
GLUCOSE UR STRIP-MCNC: NEGATIVE MG/DL
HCT VFR BLD AUTO: 35.8 % (ref 35–45)
HGB BLD-MCNC: 12.2 G/DL (ref 12–15.5)
HGB UR QL STRIP.AUTO: ABNORMAL
HOLD SPECIMEN: NORMAL
HOLD SPECIMEN: NORMAL
HYALINE CASTS UR QL AUTO: ABNORMAL /LPF
IMM GRANULOCYTES # BLD: 0.01 10*3/MM3 (ref 0–0.02)
IMM GRANULOCYTES NFR BLD: 0.1 % (ref 0–0.5)
KETONES UR QL STRIP: NEGATIVE
LEUKOCYTE ESTERASE UR QL STRIP.AUTO: NEGATIVE
LIPASE SERPL-CCNC: 59 U/L (ref 23–300)
LYMPHOCYTES # BLD AUTO: 2.02 10*3/MM3 (ref 0.6–4.2)
LYMPHOCYTES NFR BLD AUTO: 25.2 % (ref 10–50)
MCH RBC QN AUTO: 31 PG (ref 26.5–34)
MCHC RBC AUTO-ENTMCNC: 34.1 G/DL (ref 31.4–36)
MCV RBC AUTO: 90.9 FL (ref 80–98)
MONOCYTES # BLD AUTO: 0.76 10*3/MM3 (ref 0–0.9)
MONOCYTES NFR BLD AUTO: 9.5 % (ref 0–12)
NEUTROPHILS # BLD AUTO: 5.05 10*3/MM3 (ref 2–8.6)
NEUTROPHILS NFR BLD AUTO: 62.9 % (ref 37–80)
NITRITE UR QL STRIP: NEGATIVE
PH UR STRIP.AUTO: 6 [PH] (ref 5–9)
PLATELET # BLD AUTO: 273 10*3/MM3 (ref 150–450)
PMV BLD AUTO: 9.5 FL (ref 8–12)
POTASSIUM BLD-SCNC: 3.7 MMOL/L (ref 3.5–5.1)
PROT SERPL-MCNC: 7.2 G/DL (ref 6.3–8.6)
PROT UR QL STRIP: NEGATIVE
RBC # BLD AUTO: 3.94 10*6/MM3 (ref 3.77–5.16)
RBC # UR: ABNORMAL /HPF
REF LAB TEST METHOD: ABNORMAL
SODIUM BLD-SCNC: 136 MMOL/L (ref 137–145)
SP GR UR STRIP: 1 (ref 1–1.03)
SQUAMOUS #/AREA URNS HPF: ABNORMAL /HPF
TROPONIN I SERPL-MCNC: <0.012 NG/ML
TROPONIN I SERPL-MCNC: <0.012 NG/ML
TSH SERPL DL<=0.05 MIU/L-ACNC: 2.32 MIU/ML (ref 0.46–4.68)
UROBILINOGEN UR QL STRIP: ABNORMAL
WBC NRBC COR # BLD: 8.03 10*3/MM3 (ref 3.2–9.8)
WBC UR QL AUTO: ABNORMAL /HPF
WHOLE BLOOD HOLD SPECIMEN: NORMAL
WHOLE BLOOD HOLD SPECIMEN: NORMAL

## 2017-08-26 PROCEDURE — 82550 ASSAY OF CK (CPK): CPT | Performed by: NURSE PRACTITIONER

## 2017-08-26 PROCEDURE — 84443 ASSAY THYROID STIM HORMONE: CPT | Performed by: NURSE PRACTITIONER

## 2017-08-26 PROCEDURE — 84484 ASSAY OF TROPONIN QUANT: CPT | Performed by: NURSE PRACTITIONER

## 2017-08-26 PROCEDURE — G0378 HOSPITAL OBSERVATION PER HR: HCPCS

## 2017-08-26 PROCEDURE — 70450 CT HEAD/BRAIN W/O DYE: CPT

## 2017-08-26 PROCEDURE — 93010 ELECTROCARDIOGRAM REPORT: CPT | Performed by: INTERNAL MEDICINE

## 2017-08-26 PROCEDURE — 74022 RADEX COMPL AQT ABD SERIES: CPT

## 2017-08-26 PROCEDURE — 96360 HYDRATION IV INFUSION INIT: CPT

## 2017-08-26 PROCEDURE — 81001 URINALYSIS AUTO W/SCOPE: CPT | Performed by: FAMILY MEDICINE

## 2017-08-26 PROCEDURE — 93005 ELECTROCARDIOGRAM TRACING: CPT

## 2017-08-26 PROCEDURE — 85379 FIBRIN DEGRADATION QUANT: CPT | Performed by: NURSE PRACTITIONER

## 2017-08-26 PROCEDURE — 84484 ASSAY OF TROPONIN QUANT: CPT

## 2017-08-26 PROCEDURE — 85025 COMPLETE CBC W/AUTO DIFF WBC: CPT | Performed by: NURSE PRACTITIONER

## 2017-08-26 PROCEDURE — 83690 ASSAY OF LIPASE: CPT | Performed by: NURSE PRACTITIONER

## 2017-08-26 PROCEDURE — 82150 ASSAY OF AMYLASE: CPT | Performed by: NURSE PRACTITIONER

## 2017-08-26 PROCEDURE — 80053 COMPREHEN METABOLIC PANEL: CPT | Performed by: NURSE PRACTITIONER

## 2017-08-26 PROCEDURE — 82553 CREATINE MB FRACTION: CPT | Performed by: NURSE PRACTITIONER

## 2017-08-26 PROCEDURE — 99284 EMERGENCY DEPT VISIT MOD MDM: CPT

## 2017-08-26 RX ORDER — ASPIRIN 81 MG/1
81 TABLET, CHEWABLE ORAL DAILY
Status: DISCONTINUED | OUTPATIENT
Start: 2017-08-27 | End: 2017-08-27 | Stop reason: HOSPADM

## 2017-08-26 RX ORDER — DOCUSATE SODIUM 100 MG/1
100 CAPSULE, LIQUID FILLED ORAL 2 TIMES DAILY
Status: DISCONTINUED | OUTPATIENT
Start: 2017-08-27 | End: 2017-08-27 | Stop reason: HOSPADM

## 2017-08-26 RX ORDER — NITROGLYCERIN 0.4 MG/1
0.4 TABLET SUBLINGUAL
Status: DISCONTINUED | OUTPATIENT
Start: 2017-08-26 | End: 2017-08-27 | Stop reason: HOSPADM

## 2017-08-26 RX ORDER — LISINOPRIL 10 MG/1
10 TABLET ORAL DAILY
Status: DISCONTINUED | OUTPATIENT
Start: 2017-08-27 | End: 2017-08-27 | Stop reason: HOSPADM

## 2017-08-26 RX ORDER — SODIUM CHLORIDE 0.9 % (FLUSH) 0.9 %
1-10 SYRINGE (ML) INJECTION AS NEEDED
Status: DISCONTINUED | OUTPATIENT
Start: 2017-08-26 | End: 2017-08-27 | Stop reason: HOSPADM

## 2017-08-26 RX ORDER — ONDANSETRON 2 MG/ML
4 INJECTION INTRAMUSCULAR; INTRAVENOUS EVERY 6 HOURS PRN
Status: DISCONTINUED | OUTPATIENT
Start: 2017-08-26 | End: 2017-08-27 | Stop reason: HOSPADM

## 2017-08-26 RX ORDER — SODIUM CHLORIDE 0.9 % (FLUSH) 0.9 %
10 SYRINGE (ML) INJECTION AS NEEDED
Status: DISCONTINUED | OUTPATIENT
Start: 2017-08-26 | End: 2017-08-27 | Stop reason: HOSPADM

## 2017-08-26 RX ORDER — SODIUM CHLORIDE 9 MG/ML
100 INJECTION, SOLUTION INTRAVENOUS CONTINUOUS
Status: DISCONTINUED | OUTPATIENT
Start: 2017-08-26 | End: 2017-08-27 | Stop reason: HOSPADM

## 2017-08-26 RX ORDER — ACETAMINOPHEN 325 MG/1
650 TABLET ORAL EVERY 4 HOURS PRN
Status: DISCONTINUED | OUTPATIENT
Start: 2017-08-26 | End: 2017-08-27 | Stop reason: HOSPADM

## 2017-08-26 RX ADMIN — SODIUM CHLORIDE 100 ML/HR: 900 INJECTION, SOLUTION INTRAVENOUS at 23:08

## 2017-08-27 ENCOUNTER — APPOINTMENT (OUTPATIENT)
Dept: ULTRASOUND IMAGING | Facility: HOSPITAL | Age: 62
End: 2017-08-27

## 2017-08-27 ENCOUNTER — APPOINTMENT (OUTPATIENT)
Dept: CARDIOLOGY | Facility: HOSPITAL | Age: 62
End: 2017-08-27
Attending: FAMILY MEDICINE

## 2017-08-27 VITALS
HEIGHT: 60 IN | OXYGEN SATURATION: 98 % | TEMPERATURE: 97.2 F | DIASTOLIC BLOOD PRESSURE: 59 MMHG | BODY MASS INDEX: 33.69 KG/M2 | SYSTOLIC BLOOD PRESSURE: 114 MMHG | WEIGHT: 171.6 LBS | RESPIRATION RATE: 18 BRPM | HEART RATE: 66 BPM

## 2017-08-27 PROBLEM — R07.9 CHEST PAIN: Status: RESOLVED | Noted: 2017-08-26 | Resolved: 2017-08-27

## 2017-08-27 LAB
ANION GAP SERPL CALCULATED.3IONS-SCNC: 8 MMOL/L (ref 5–15)
BASOPHILS # BLD AUTO: 0.02 10*3/MM3 (ref 0–0.2)
BASOPHILS NFR BLD AUTO: 0.3 % (ref 0–2)
BH CV ECHO MEAS - ACS: 2 CM
BH CV ECHO MEAS - AO MAX PG (FULL): 2.4 MMHG
BH CV ECHO MEAS - AO MAX PG: 7.9 MMHG
BH CV ECHO MEAS - AO MEAN PG (FULL): 0.44 MMHG
BH CV ECHO MEAS - AO MEAN PG: 3.9 MMHG
BH CV ECHO MEAS - AO ROOT AREA: 9.5 CM^2
BH CV ECHO MEAS - AO ROOT DIAM: 3.5 CM
BH CV ECHO MEAS - AO V2 MAX: 140.5 CM/SEC
BH CV ECHO MEAS - AO V2 MEAN: 94.3 CM/SEC
BH CV ECHO MEAS - AO V2 VTI: 33.5 CM
BH CV ECHO MEAS - AVA(I,A): 2.7 CM^2
BH CV ECHO MEAS - AVA(I,D): 2.7 CM^2
BH CV ECHO MEAS - AVA(V,A): 2.6 CM^2
BH CV ECHO MEAS - AVA(V,D): 2.6 CM^2
BH CV ECHO MEAS - EDV(CUBED): 79.6 ML
BH CV ECHO MEAS - EDV(TEICH): 83.1 ML
BH CV ECHO MEAS - EF(CUBED): 66.5 %
BH CV ECHO MEAS - EF(MOD-SP4): 60 %
BH CV ECHO MEAS - EF(TEICH): 58.3 %
BH CV ECHO MEAS - ESV(CUBED): 26.7 ML
BH CV ECHO MEAS - ESV(TEICH): 34.7 ML
BH CV ECHO MEAS - FS: 30.5 %
BH CV ECHO MEAS - IVS/LVPW: 1.2
BH CV ECHO MEAS - IVSD: 0.91 CM
BH CV ECHO MEAS - LA DIMENSION: 3.4 CM
BH CV ECHO MEAS - LA/AO: 0.97
BH CV ECHO MEAS - LV MASS(C)D: 113.5 GRAMS
BH CV ECHO MEAS - LV MAX PG: 5.5 MMHG
BH CV ECHO MEAS - LV MEAN PG: 3.5 MMHG
BH CV ECHO MEAS - LV V1 MAX: 117.6 CM/SEC
BH CV ECHO MEAS - LV V1 MEAN: 91.2 CM/SEC
BH CV ECHO MEAS - LV V1 VTI: 29.2 CM
BH CV ECHO MEAS - LVIDD: 4.3 CM
BH CV ECHO MEAS - LVIDS: 3 CM
BH CV ECHO MEAS - LVOT AREA (M): 3.1 CM^2
BH CV ECHO MEAS - LVOT AREA: 3.1 CM^2
BH CV ECHO MEAS - LVOT DIAM: 2 CM
BH CV ECHO MEAS - LVPWD: 0.78 CM
BH CV ECHO MEAS - MV A MAX VEL: 76.6 CM/SEC
BH CV ECHO MEAS - MV DEC SLOPE: 746.3 CM/SEC^2
BH CV ECHO MEAS - MV E MAX VEL: 100.6 CM/SEC
BH CV ECHO MEAS - MV E/A: 1.3
BH CV ECHO MEAS - MV MAX PG: 4.6 MMHG
BH CV ECHO MEAS - MV MEAN PG: 1.4 MMHG
BH CV ECHO MEAS - MV P1/2T MAX VEL: 107.9 CM/SEC
BH CV ECHO MEAS - MV P1/2T: 42.4 MSEC
BH CV ECHO MEAS - MV V2 MAX: 107.2 CM/SEC
BH CV ECHO MEAS - MV V2 MEAN: 53.5 CM/SEC
BH CV ECHO MEAS - MV V2 VTI: 36.7 CM
BH CV ECHO MEAS - MVA P1/2T LCG: 2 CM^2
BH CV ECHO MEAS - MVA(P1/2T): 5.2 CM^2
BH CV ECHO MEAS - MVA(VTI): 2.4 CM^2
BH CV ECHO MEAS - PA MAX PG: 3 MMHG
BH CV ECHO MEAS - PA V2 MAX: 86.9 CM/SEC
BH CV ECHO MEAS - RAP SYSTOLE: 10 MMHG
BH CV ECHO MEAS - RVDD: 2.2 CM
BH CV ECHO MEAS - RVSP: 31.4 MMHG
BH CV ECHO MEAS - SV(AO): 317.6 ML
BH CV ECHO MEAS - SV(CUBED): 52.9 ML
BH CV ECHO MEAS - SV(LVOT): 89.7 ML
BH CV ECHO MEAS - SV(TEICH): 48.4 ML
BH CV ECHO MEAS - TR MAX VEL: 231.3 CM/SEC
BUN BLD-MCNC: 15 MG/DL (ref 7–21)
BUN/CREAT SERPL: 15.6 (ref 7–25)
CALCIUM SPEC-SCNC: 9.3 MG/DL (ref 8.4–10.2)
CHLORIDE SERPL-SCNC: 104 MMOL/L (ref 95–110)
CO2 SERPL-SCNC: 29 MMOL/L (ref 22–31)
CREAT BLD-MCNC: 0.96 MG/DL (ref 0.5–1)
DEPRECATED RDW RBC AUTO: 42.7 FL (ref 36.4–46.3)
EOSINOPHIL # BLD AUTO: 0.22 10*3/MM3 (ref 0–0.7)
EOSINOPHIL NFR BLD AUTO: 3.3 % (ref 0–7)
ERYTHROCYTE [DISTWIDTH] IN BLOOD BY AUTOMATED COUNT: 12.7 % (ref 11.5–14.5)
GFR SERPL CREATININE-BSD FRML MDRD: 59 ML/MIN/1.73 (ref 60–104)
GLUCOSE BLD-MCNC: 98 MG/DL (ref 60–100)
HCT VFR BLD AUTO: 33.2 % (ref 35–45)
HGB BLD-MCNC: 11.2 G/DL (ref 12–15.5)
IMM GRANULOCYTES # BLD: 0.01 10*3/MM3 (ref 0–0.02)
IMM GRANULOCYTES NFR BLD: 0.1 % (ref 0–0.5)
LV EF 2D ECHO EST: 55 %
LYMPHOCYTES # BLD AUTO: 2.64 10*3/MM3 (ref 0.6–4.2)
LYMPHOCYTES NFR BLD AUTO: 39.6 % (ref 10–50)
MCH RBC QN AUTO: 30.9 PG (ref 26.5–34)
MCHC RBC AUTO-ENTMCNC: 33.7 G/DL (ref 31.4–36)
MCV RBC AUTO: 91.5 FL (ref 80–98)
MONOCYTES # BLD AUTO: 0.65 10*3/MM3 (ref 0–0.9)
MONOCYTES NFR BLD AUTO: 9.7 % (ref 0–12)
NEUTROPHILS # BLD AUTO: 3.13 10*3/MM3 (ref 2–8.6)
NEUTROPHILS NFR BLD AUTO: 47 % (ref 37–80)
PLATELET # BLD AUTO: 251 10*3/MM3 (ref 150–450)
PMV BLD AUTO: 9.5 FL (ref 8–12)
POTASSIUM BLD-SCNC: 3.5 MMOL/L (ref 3.5–5.1)
RBC # BLD AUTO: 3.63 10*6/MM3 (ref 3.77–5.16)
SODIUM BLD-SCNC: 141 MMOL/L (ref 137–145)
TROPONIN I SERPL-MCNC: <0.012 NG/ML
WBC NRBC COR # BLD: 6.67 10*3/MM3 (ref 3.2–9.8)

## 2017-08-27 PROCEDURE — G0378 HOSPITAL OBSERVATION PER HR: HCPCS

## 2017-08-27 PROCEDURE — 93880 EXTRACRANIAL BILAT STUDY: CPT

## 2017-08-27 PROCEDURE — 85025 COMPLETE CBC W/AUTO DIFF WBC: CPT | Performed by: FAMILY MEDICINE

## 2017-08-27 PROCEDURE — 84484 ASSAY OF TROPONIN QUANT: CPT

## 2017-08-27 PROCEDURE — 80048 BASIC METABOLIC PNL TOTAL CA: CPT | Performed by: FAMILY MEDICINE

## 2017-08-27 PROCEDURE — 93306 TTE W/DOPPLER COMPLETE: CPT | Performed by: INTERNAL MEDICINE

## 2017-08-27 PROCEDURE — 93306 TTE W/DOPPLER COMPLETE: CPT

## 2017-08-27 PROCEDURE — 96361 HYDRATE IV INFUSION ADD-ON: CPT

## 2017-08-27 RX ADMIN — ASPIRIN 81 MG 81 MG: 81 TABLET ORAL at 08:17

## 2017-08-27 RX ADMIN — SODIUM CHLORIDE 100 ML/HR: 900 INJECTION, SOLUTION INTRAVENOUS at 09:00

## 2017-08-27 RX ADMIN — DOCUSATE SODIUM 100 MG: 100 CAPSULE, LIQUID FILLED ORAL at 08:17

## 2017-09-26 ENCOUNTER — OFFICE VISIT (OUTPATIENT)
Dept: CARDIOLOGY | Facility: CLINIC | Age: 62
End: 2017-09-26

## 2017-09-26 VITALS
BODY MASS INDEX: 33.38 KG/M2 | HEIGHT: 60 IN | DIASTOLIC BLOOD PRESSURE: 80 MMHG | WEIGHT: 170 LBS | HEART RATE: 68 BPM | SYSTOLIC BLOOD PRESSURE: 118 MMHG

## 2017-09-26 DIAGNOSIS — I10 ESSENTIAL HYPERTENSION: Primary | ICD-10-CM

## 2017-09-26 DIAGNOSIS — R07.2 PRECORDIAL PAIN: ICD-10-CM

## 2017-09-26 PROCEDURE — 99203 OFFICE O/P NEW LOW 30 MIN: CPT | Performed by: INTERNAL MEDICINE

## 2017-09-26 RX ORDER — LISINOPRIL 10 MG/1
10 TABLET ORAL DAILY PRN
COMMUNITY
Start: 2017-08-30 | End: 2017-09-26

## 2017-09-26 RX ORDER — LISINOPRIL 2.5 MG/1
2.5 TABLET ORAL DAILY
Qty: 90 TABLET | Refills: 3 | Status: SHIPPED | OUTPATIENT
Start: 2017-09-26

## 2017-09-26 NOTE — PROGRESS NOTES
[facility] Cardiology  OFFICE NOTE    Gabriela Celaya  62 y.o. female    09/26/2017  1. Essential hypertension    2. Precordial pain        Chief complaint -Episode of syncope with the epigastric discomfort      History of present Illness- 62-year-old lady who had an episode of syncope and an episode of discomfort in the chest area.  She has been doing well and she has been taking her lisinopril 10 mg 2 or 3 times a week.  I asked her to change it to 2.5 mg daily.  She had a echo which was unremarkable and a carotid duplex which showed less than 70% stenosis.  She denies any headache or visual complaints her EKG has been unremarkable.              No Known Allergies      Past Medical History:   Diagnosis Date   • Acute bronchitis    • Acute pharyngitis    • Allergic asthma     IgE-mediated allergic asthma      • Allergic rhinitis    • Asthmatic bronchitis    • Cough    • Diabetes mellitus screening    • Dizziness and giddiness    • Encounter for long-term current use of medication    • Essential hypertension    • Fatigue    • History of bone density study     DEXA (bone density) test, peripheral   • History of mammography, diagnostic    • Nuclear senile cataract    • Otalgia    • Shoulder pain     bursitis vs tendonitis      • Sprain of ankle, left    • Upper respiratory infection    • Vertigo    • Wheezing          Past Surgical History:   Procedure Laterality Date   • CARDIAC CATHETERIZATION  04/16/2008    Cardiac cath (negative)   • ENDOSCOPY N/A 3/9/2017    Procedure: ESOPHAGOGASTRODUODENOSCOPY possible dilation ;  Surgeon: Prince White MD;  Location: [facility] ENDOSCOPY;  Service:    • INJECTION OF MEDICATION  03/05/2013   • PAP SMEAR     • PELVIC FRACTURE SURGERY  1973    Anesth, repair fracture, pelvis   • TUBAL ABDOMINAL LIGATION           Family History   Problem Relation Age of Onset   • Asthma Mother    • Diabetes Mother    • Stroke Mother      cva in her 50's   • Cancer Father  "   • Diabetes Father    • Heart attack Father      mi in 50's   • No Known Problems Son          Social History     Social History   • Marital status:      Spouse name: N/A   • Number of children: N/A   • Years of education: N/A     Occupational History   • Not on file.     Social History Main Topics   • Smoking status: Never Smoker   • Smokeless tobacco: Never Used   • Alcohol use No   • Drug use: No   • Sexual activity: Defer     Other Topics Concern   • Not on file     Social History Narrative         Current Outpatient Prescriptions   Medication Sig Dispense Refill   • aspirin 81 MG tablet Take 81 mg by mouth Daily.     • vitamin D (ERGOCALCIFEROL) 90818 UNITS capsule capsule Take 50,000 Units by mouth Every 30 (Thirty) Days.     • lisinopril (PRINIVIL,ZESTRIL) 2.5 MG tablet Take 1 tablet by mouth Daily. 90 tablet 3     No current facility-administered medications for this visit.          Review of Systems     Constitution: Denies any fatigue, fever or chills    HENT: Denies any headache, hearing impairment,     Eyes: Denies any blurring of vision, or photophobia     Cardivascular - As per history of present illness     Respiratory system-denies any COPD, shortness of breath,   sleep apnea.     Endocrine:  No history of hyperlipidemia, diabetes,                      Hypothyrodism       Musculoskeletal:  No history of arthritis with musculoskeletal problems    Gastrointestinal: No nausea, vomiting, or melena    Genitourinary: No dysuria or hematuria    Neurological:   No history of seizure disorder, stroke, memory problems    Psychiatric/Behavioral:        No history of depression,  No history of bipolar disorder or schizophrenia     Hematological- no history of easy bruising or any bleeding diathesis            OBJECTIVE    /80  Pulse 68  Ht 60\" (152.4 cm)  Wt 170 lb (77.1 kg)  BMI 33.2 kg/m2      Physical Exam     Constitutional: is oriented to person, place, and time.     Skin-warm and " dry    Well developed and nourished in no acute distress      Head: Normocephalic and atraumatic.     Eyes: Pupils are equal, round, and reactive to light.     Neck: Neck supple. No bruit in the carotids, no elevation of JVD    Cardiovascular: Caldwell in the fifth intercostal space   Regular rate, and  Rhythm,    S1 greater than S2, no S3 or S4, no gallop     Pulmonary/Chest:   Air  Entry is equal on both sides  No wheezing or crackles,      Abdominal: Soft.  No hepatosplenomegaly, bowel sounds are present    Musculoskeletal: No kyphoscoliosis, no significant thickening of the joints    Neurological: is alert and oriented to person, place, and time.    cranial nerve are intact .   No motor or sensory deficit    Extremities-no edema, no radial femoral delay      Psychiatric: He has a normal mood and affect.                  His behavior is normal.           Procedures      Lab Results   Component Value Date    WBC 6.67 08/27/2017    HGB 11.2 (L) 08/27/2017    HCT 33.2 (L) 08/27/2017    MCV 91.5 08/27/2017     08/27/2017     Lab Results   Component Value Date    GLUCOSE 98 08/27/2017    BUN 15 08/27/2017    CREATININE 0.96 08/27/2017    EGFRIFNONA 59 (L) 08/27/2017    BCR 15.6 08/27/2017    CO2 29.0 08/27/2017    CALCIUM 9.3 08/27/2017    ALBUMIN 4.40 08/26/2017    LABIL2 1.6 08/26/2017    AST 27 08/26/2017    ALT 39 08/26/2017     Lab Results   Component Value Date    TSH 2.320 08/26/2017                  A/P    Dizziness with near syncope-carotid duplex and echo are unremarkable, possibly getting low blood pressure with the blood pressure medicines had decreased the dose to 2.5 mg daily.  We'll do exercise treadmill to assess her blood pressure response in about a week.  If that is okay we'll reassure her and continue the lower dose of blood pressure medicines.            This document has been electronically signed by Kris Rendon MD on September 26, 2017 3:34 PM       EMR Dragon/Transcription  disclaimer:   Some of this note may be an electronic transcription/translation of spoken language to printed text. The electronic translation of spoken language may permit erroneous, or at times, nonsensical words or phrases to be inadvertently transcribed; Although I have reviewed the note for such errors, some may still exist.

## 2017-11-16 ENCOUNTER — HOSPITAL ENCOUNTER (OUTPATIENT)
Dept: CARDIOLOGY | Facility: HOSPITAL | Age: 62
Discharge: HOME | End: 2017-11-16
Attending: INTERNAL MEDICINE

## 2017-11-16 PROCEDURE — 93016 CV STRESS TEST SUPVJ ONLY: CPT | Performed by: INTERNAL MEDICINE

## 2017-11-16 PROCEDURE — 93018 CV STRESS TEST I&R ONLY: CPT | Performed by: INTERNAL MEDICINE

## 2017-11-16 PROCEDURE — 93017 CV STRESS TEST TRACING ONLY: CPT

## 2017-11-19 LAB
BH CV STRESS BP STAGE 1: NORMAL
BH CV STRESS BP STAGE 2: NORMAL
BH CV STRESS DURATION MIN STAGE 1: 3
BH CV STRESS DURATION MIN STAGE 2: 3
BH CV STRESS DURATION MIN STAGE 3: 3
BH CV STRESS DURATION SEC STAGE 1: 0
BH CV STRESS DURATION SEC STAGE 2: 0
BH CV STRESS DURATION SEC STAGE 3: 0
BH CV STRESS DURATION SEC STAGE 4: 33
BH CV STRESS GRADE STAGE 1: 10
BH CV STRESS GRADE STAGE 2: 12
BH CV STRESS GRADE STAGE 3: 14
BH CV STRESS GRADE STAGE 4: 16
BH CV STRESS HR STAGE 1: 126
BH CV STRESS HR STAGE 2: 134
BH CV STRESS HR STAGE 3: 150
BH CV STRESS HR STAGE 4: 155
BH CV STRESS METS STAGE 1: 5
BH CV STRESS METS STAGE 2: 7.5
BH CV STRESS METS STAGE 3: 10
BH CV STRESS METS STAGE 4: 13.5
BH CV STRESS PROTOCOL 1: NORMAL
BH CV STRESS RECOVERY BP: NORMAL MMHG
BH CV STRESS RECOVERY HR: 97 BPM
BH CV STRESS SPEED STAGE 1: 1.7
BH CV STRESS SPEED STAGE 2: 2.5
BH CV STRESS SPEED STAGE 3: 3.4
BH CV STRESS SPEED STAGE 4: 4.2
BH CV STRESS STAGE 1: 1
BH CV STRESS STAGE 2: 2
BH CV STRESS STAGE 3: 3
BH CV STRESS STAGE 4: 4
MAXIMAL PREDICTED HEART RATE: 160 BPM
PERCENT MAX PREDICTED HR: 96.88 %
STRESS BASELINE BP: NORMAL MMHG
STRESS BASELINE HR: 76 BPM
STRESS PERCENT HR: 114 %
STRESS POST ESTIMATED WORKLOAD: 10.9 METS
STRESS POST EXERCISE DUR MIN: 9 MIN
STRESS POST EXERCISE DUR SEC: 33 SEC
STRESS POST PEAK BP: NORMAL MMHG
STRESS POST PEAK HR: 155 BPM
STRESS TARGET HR: 136 BPM

## 2017-12-31 ENCOUNTER — HOSPITAL ENCOUNTER (EMERGENCY)
Facility: HOSPITAL | Age: 62
Discharge: HOME | End: 2017-12-31
Attending: FAMILY MEDICINE | Admitting: FAMILY MEDICINE

## 2017-12-31 VITALS
OXYGEN SATURATION: 97 % | DIASTOLIC BLOOD PRESSURE: 72 MMHG | HEART RATE: 61 BPM | HEIGHT: 60 IN | BODY MASS INDEX: 30.25 KG/M2 | RESPIRATION RATE: 18 BRPM | TEMPERATURE: 97.4 F | WEIGHT: 154.1 LBS | SYSTOLIC BLOOD PRESSURE: 140 MMHG

## 2017-12-31 DIAGNOSIS — R82.71 BACTERIURIA, ASYMPTOMATIC: ICD-10-CM

## 2017-12-31 DIAGNOSIS — R53.1 GENERALIZED WEAKNESS: Primary | ICD-10-CM

## 2017-12-31 LAB
ALBUMIN SERPL-MCNC: 4.2 G/DL (ref 3.4–4.8)
ALBUMIN/GLOB SERPL: 1.4 G/DL (ref 1.1–1.8)
ALP SERPL-CCNC: 72 U/L (ref 38–126)
ALT SERPL W P-5'-P-CCNC: 37 U/L (ref 9–52)
ANION GAP SERPL CALCULATED.3IONS-SCNC: 13 MMOL/L (ref 5–15)
AST SERPL-CCNC: 39 U/L (ref 14–36)
BACTERIA UR QL AUTO: ABNORMAL /HPF
BASOPHILS # BLD AUTO: 0 10*3/MM3 (ref 0–0.2)
BASOPHILS NFR BLD AUTO: 0 % (ref 0–2)
BILIRUB SERPL-MCNC: 0.4 MG/DL (ref 0.2–1.3)
BILIRUB UR QL STRIP: NEGATIVE
BUN BLD-MCNC: 10 MG/DL (ref 7–21)
BUN/CREAT SERPL: 19.6 (ref 7–25)
CALCIUM SPEC-SCNC: 9.1 MG/DL (ref 8.4–10.2)
CHLORIDE SERPL-SCNC: 101 MMOL/L (ref 95–110)
CLARITY UR: CLEAR
CO2 SERPL-SCNC: 25 MMOL/L (ref 22–31)
COLOR UR: YELLOW
CREAT BLD-MCNC: 0.51 MG/DL (ref 0.5–1)
DEPRECATED RDW RBC AUTO: 39.2 FL (ref 36.4–46.3)
EOSINOPHIL # BLD AUTO: 0.01 10*3/MM3 (ref 0–0.7)
EOSINOPHIL NFR BLD AUTO: 0.2 % (ref 0–7)
ERYTHROCYTE [DISTWIDTH] IN BLOOD BY AUTOMATED COUNT: 12 % (ref 11.5–14.5)
GFR SERPL CREATININE-BSD FRML MDRD: 123 ML/MIN/1.73 (ref 60–104)
GLOBULIN UR ELPH-MCNC: 3.1 GM/DL (ref 2.3–3.5)
GLUCOSE BLD-MCNC: 100 MG/DL (ref 60–100)
GLUCOSE UR STRIP-MCNC: NEGATIVE MG/DL
HCT VFR BLD AUTO: 39.3 % (ref 35–45)
HGB BLD-MCNC: 13.5 G/DL (ref 12–15.5)
HGB UR QL STRIP.AUTO: ABNORMAL
HOLD SPECIMEN: NORMAL
HYALINE CASTS UR QL AUTO: ABNORMAL /LPF
IMM GRANULOCYTES # BLD: 0.01 10*3/MM3 (ref 0–0.02)
IMM GRANULOCYTES NFR BLD: 0.2 % (ref 0–0.5)
KETONES UR QL STRIP: NEGATIVE
LEUKOCYTE ESTERASE UR QL STRIP.AUTO: NEGATIVE
LYMPHOCYTES # BLD AUTO: 1.18 10*3/MM3 (ref 0.6–4.2)
LYMPHOCYTES NFR BLD AUTO: 25.8 % (ref 10–50)
MAGNESIUM SERPL-MCNC: 1.9 MG/DL (ref 1.6–2.3)
MCH RBC QN AUTO: 30.5 PG (ref 26.5–34)
MCHC RBC AUTO-ENTMCNC: 34.4 G/DL (ref 31.4–36)
MCV RBC AUTO: 88.9 FL (ref 80–98)
MONOCYTES # BLD AUTO: 0.4 10*3/MM3 (ref 0–0.9)
MONOCYTES NFR BLD AUTO: 8.7 % (ref 0–12)
NEUTROPHILS # BLD AUTO: 2.98 10*3/MM3 (ref 2–8.6)
NEUTROPHILS NFR BLD AUTO: 65.1 % (ref 37–80)
NITRITE UR QL STRIP: NEGATIVE
PH UR STRIP.AUTO: 7 [PH] (ref 5–9)
PLATELET # BLD AUTO: 225 10*3/MM3 (ref 150–450)
PMV BLD AUTO: 9.4 FL (ref 8–12)
POTASSIUM BLD-SCNC: 3.8 MMOL/L (ref 3.5–5.1)
PROT SERPL-MCNC: 7.3 G/DL (ref 6.3–8.6)
PROT UR QL STRIP: NEGATIVE
RBC # BLD AUTO: 4.42 10*6/MM3 (ref 3.77–5.16)
RBC # UR: ABNORMAL /HPF
REF LAB TEST METHOD: ABNORMAL
SODIUM BLD-SCNC: 139 MMOL/L (ref 137–145)
SP GR UR STRIP: 1.01 (ref 1–1.03)
SQUAMOUS #/AREA URNS HPF: ABNORMAL /HPF
UROBILINOGEN UR QL STRIP: ABNORMAL
WBC NRBC COR # BLD: 4.58 10*3/MM3 (ref 3.2–9.8)
WBC UR QL AUTO: ABNORMAL /HPF
WHOLE BLOOD HOLD SPECIMEN: NORMAL

## 2017-12-31 PROCEDURE — 85025 COMPLETE CBC W/AUTO DIFF WBC: CPT | Performed by: PHYSICIAN ASSISTANT

## 2017-12-31 PROCEDURE — 83735 ASSAY OF MAGNESIUM: CPT | Performed by: PHYSICIAN ASSISTANT

## 2017-12-31 PROCEDURE — 81001 URINALYSIS AUTO W/SCOPE: CPT | Performed by: PHYSICIAN ASSISTANT

## 2017-12-31 PROCEDURE — 96360 HYDRATION IV INFUSION INIT: CPT

## 2017-12-31 PROCEDURE — 96361 HYDRATE IV INFUSION ADD-ON: CPT

## 2017-12-31 PROCEDURE — 80053 COMPREHEN METABOLIC PANEL: CPT | Performed by: PHYSICIAN ASSISTANT

## 2017-12-31 PROCEDURE — 99284 EMERGENCY DEPT VISIT MOD MDM: CPT

## 2017-12-31 RX ORDER — SODIUM CHLORIDE 9 MG/ML
125 INJECTION, SOLUTION INTRAVENOUS CONTINUOUS
Status: DISCONTINUED | OUTPATIENT
Start: 2017-12-31 | End: 2017-12-31 | Stop reason: HOSPADM

## 2017-12-31 RX ORDER — AZITHROMYCIN 1 G
1 PACKET (EA) ORAL ONCE
COMMUNITY
End: 2018-03-27

## 2017-12-31 RX ORDER — SODIUM CHLORIDE 0.9 % (FLUSH) 0.9 %
10 SYRINGE (ML) INJECTION AS NEEDED
Status: DISCONTINUED | OUTPATIENT
Start: 2017-12-31 | End: 2017-12-31 | Stop reason: HOSPADM

## 2017-12-31 RX ADMIN — SODIUM CHLORIDE 125 ML/HR: 900 INJECTION, SOLUTION INTRAVENOUS at 09:55

## 2018-01-04 ENCOUNTER — APPOINTMENT (OUTPATIENT)
Dept: CT IMAGING | Facility: HOSPITAL | Age: 63
End: 2018-01-04

## 2018-01-04 ENCOUNTER — APPOINTMENT (OUTPATIENT)
Dept: GENERAL RADIOLOGY | Facility: HOSPITAL | Age: 63
End: 2018-01-04

## 2018-01-04 ENCOUNTER — HOSPITAL ENCOUNTER (EMERGENCY)
Facility: HOSPITAL | Age: 63
Discharge: HOME | End: 2018-01-04
Attending: EMERGENCY MEDICINE | Admitting: EMERGENCY MEDICINE

## 2018-01-04 VITALS
HEART RATE: 88 BPM | BODY MASS INDEX: 32 KG/M2 | RESPIRATION RATE: 18 BRPM | SYSTOLIC BLOOD PRESSURE: 102 MMHG | TEMPERATURE: 97.4 F | OXYGEN SATURATION: 98 % | DIASTOLIC BLOOD PRESSURE: 57 MMHG | HEIGHT: 60 IN | WEIGHT: 163 LBS

## 2018-01-04 DIAGNOSIS — R53.1 GENERAL WEAKNESS: Primary | ICD-10-CM

## 2018-01-04 DIAGNOSIS — I95.9 HYPOTENSION, UNSPECIFIED HYPOTENSION TYPE: ICD-10-CM

## 2018-01-04 DIAGNOSIS — R55 VASOVAGAL NEAR SYNCOPE: ICD-10-CM

## 2018-01-04 DIAGNOSIS — E86.0 DEHYDRATION: ICD-10-CM

## 2018-01-04 LAB
ALBUMIN SERPL-MCNC: 4.7 G/DL (ref 3.4–4.8)
ALBUMIN/GLOB SERPL: 1.6 G/DL (ref 1.1–1.8)
ALP SERPL-CCNC: 80 U/L (ref 38–126)
ALT SERPL W P-5'-P-CCNC: 43 U/L (ref 9–52)
ANION GAP SERPL CALCULATED.3IONS-SCNC: 16 MMOL/L (ref 5–15)
AST SERPL-CCNC: 27 U/L (ref 14–36)
BACTERIA UR QL AUTO: ABNORMAL /HPF
BASOPHILS # BLD AUTO: 0.01 10*3/MM3 (ref 0–0.2)
BASOPHILS NFR BLD AUTO: 0.1 % (ref 0–2)
BILIRUB SERPL-MCNC: 0.5 MG/DL (ref 0.2–1.3)
BILIRUB UR QL STRIP: NEGATIVE
BUN BLD-MCNC: 18 MG/DL (ref 7–21)
BUN/CREAT SERPL: 11.8 (ref 7–25)
CALCIUM SPEC-SCNC: 10.4 MG/DL (ref 8.4–10.2)
CHLORIDE SERPL-SCNC: 98 MMOL/L (ref 95–110)
CLARITY UR: ABNORMAL
CO2 SERPL-SCNC: 24 MMOL/L (ref 22–31)
COLOR UR: YELLOW
CREAT BLD-MCNC: 1.52 MG/DL (ref 0.5–1)
D-LACTATE SERPL-SCNC: 1.4 MMOL/L (ref 0.5–2)
D-LACTATE SERPL-SCNC: 2.6 MMOL/L (ref 0.5–2)
DEPRECATED RDW RBC AUTO: 39.3 FL (ref 36.4–46.3)
EOSINOPHIL # BLD AUTO: 0.06 10*3/MM3 (ref 0–0.7)
EOSINOPHIL NFR BLD AUTO: 0.5 % (ref 0–7)
ERYTHROCYTE [DISTWIDTH] IN BLOOD BY AUTOMATED COUNT: 12.3 % (ref 11.5–14.5)
GFR SERPL CREATININE-BSD FRML MDRD: 35 ML/MIN/1.73 (ref 60–104)
GLOBULIN UR ELPH-MCNC: 3 GM/DL (ref 2.3–3.5)
GLUCOSE BLD-MCNC: 118 MG/DL (ref 60–100)
GLUCOSE UR STRIP-MCNC: NEGATIVE MG/DL
GRAN CASTS URNS QL MICRO: ABNORMAL /LPF
HCT VFR BLD AUTO: 41.2 % (ref 35–45)
HGB BLD-MCNC: 14.3 G/DL (ref 12–15.5)
HGB UR QL STRIP.AUTO: NEGATIVE
HOLD SPECIMEN: NORMAL
HOLD SPECIMEN: NORMAL
HYALINE CASTS UR QL AUTO: ABNORMAL /LPF
IMM GRANULOCYTES # BLD: 0.08 10*3/MM3 (ref 0–0.02)
IMM GRANULOCYTES NFR BLD: 0.7 % (ref 0–0.5)
KETONES UR QL STRIP: ABNORMAL
LEUKOCYTE ESTERASE UR QL STRIP.AUTO: ABNORMAL
LIPASE SERPL-CCNC: 140 U/L (ref 23–300)
LYMPHOCYTES # BLD AUTO: 2.31 10*3/MM3 (ref 0.6–4.2)
LYMPHOCYTES NFR BLD AUTO: 20.3 % (ref 10–50)
MAGNESIUM SERPL-MCNC: 1.8 MG/DL (ref 1.6–2.3)
MCH RBC QN AUTO: 30.9 PG (ref 26.5–34)
MCHC RBC AUTO-ENTMCNC: 34.7 G/DL (ref 31.4–36)
MCV RBC AUTO: 89 FL (ref 80–98)
MONOCYTES # BLD AUTO: 0.94 10*3/MM3 (ref 0–0.9)
MONOCYTES NFR BLD AUTO: 8.3 % (ref 0–12)
MUCOUS THREADS URNS QL MICRO: ABNORMAL /HPF
NEUTROPHILS # BLD AUTO: 7.99 10*3/MM3 (ref 2–8.6)
NEUTROPHILS NFR BLD AUTO: 70.1 % (ref 37–80)
NITRITE UR QL STRIP: NEGATIVE
PH UR STRIP.AUTO: 5.5 [PH] (ref 5–9)
PLATELET # BLD AUTO: 313 10*3/MM3 (ref 150–450)
PMV BLD AUTO: 9.7 FL (ref 8–12)
POTASSIUM BLD-SCNC: 4 MMOL/L (ref 3.5–5.1)
PROT SERPL-MCNC: 7.7 G/DL (ref 6.3–8.6)
PROT UR QL STRIP: ABNORMAL
RBC # BLD AUTO: 4.63 10*6/MM3 (ref 3.77–5.16)
RBC # UR: ABNORMAL /HPF
REF LAB TEST METHOD: ABNORMAL
SODIUM BLD-SCNC: 138 MMOL/L (ref 137–145)
SP GR UR STRIP: 1.01 (ref 1–1.03)
SQUAMOUS #/AREA URNS HPF: ABNORMAL /HPF
TROPONIN I SERPL-MCNC: <0.012 NG/ML
TSH SERPL DL<=0.05 MIU/L-ACNC: 6.14 MIU/ML (ref 0.46–4.68)
UROBILINOGEN UR QL STRIP: ABNORMAL
WBC CASTS #/AREA URNS LPF: ABNORMAL /LPF
WBC NRBC COR # BLD: 11.39 10*3/MM3 (ref 3.2–9.8)
WBC UR QL AUTO: ABNORMAL /HPF
WHOLE BLOOD HOLD SPECIMEN: NORMAL
WHOLE BLOOD HOLD SPECIMEN: NORMAL

## 2018-01-04 PROCEDURE — 83735 ASSAY OF MAGNESIUM: CPT | Performed by: EMERGENCY MEDICINE

## 2018-01-04 PROCEDURE — 99284 EMERGENCY DEPT VISIT MOD MDM: CPT

## 2018-01-04 PROCEDURE — 71045 X-RAY EXAM CHEST 1 VIEW: CPT

## 2018-01-04 PROCEDURE — 84443 ASSAY THYROID STIM HORMONE: CPT | Performed by: EMERGENCY MEDICINE

## 2018-01-04 PROCEDURE — 80053 COMPREHEN METABOLIC PANEL: CPT | Performed by: EMERGENCY MEDICINE

## 2018-01-04 PROCEDURE — 96360 HYDRATION IV INFUSION INIT: CPT

## 2018-01-04 PROCEDURE — 84484 ASSAY OF TROPONIN QUANT: CPT | Performed by: EMERGENCY MEDICINE

## 2018-01-04 PROCEDURE — 85025 COMPLETE CBC W/AUTO DIFF WBC: CPT | Performed by: EMERGENCY MEDICINE

## 2018-01-04 PROCEDURE — 93005 ELECTROCARDIOGRAM TRACING: CPT | Performed by: EMERGENCY MEDICINE

## 2018-01-04 PROCEDURE — 96361 HYDRATE IV INFUSION ADD-ON: CPT

## 2018-01-04 PROCEDURE — 70450 CT HEAD/BRAIN W/O DYE: CPT

## 2018-01-04 PROCEDURE — 83605 ASSAY OF LACTIC ACID: CPT | Performed by: EMERGENCY MEDICINE

## 2018-01-04 PROCEDURE — 87086 URINE CULTURE/COLONY COUNT: CPT | Performed by: EMERGENCY MEDICINE

## 2018-01-04 PROCEDURE — 83690 ASSAY OF LIPASE: CPT | Performed by: EMERGENCY MEDICINE

## 2018-01-04 PROCEDURE — 81001 URINALYSIS AUTO W/SCOPE: CPT | Performed by: EMERGENCY MEDICINE

## 2018-01-04 PROCEDURE — 93010 ELECTROCARDIOGRAM REPORT: CPT | Performed by: INTERNAL MEDICINE

## 2018-01-04 RX ORDER — SODIUM CHLORIDE 0.9 % (FLUSH) 0.9 %
10 SYRINGE (ML) INJECTION AS NEEDED
Status: DISCONTINUED | OUTPATIENT
Start: 2018-01-04 | End: 2018-01-04 | Stop reason: HOSPADM

## 2018-01-04 RX ADMIN — SODIUM CHLORIDE 1000 ML: 900 INJECTION, SOLUTION INTRAVENOUS at 17:56

## 2018-01-04 RX ADMIN — SODIUM CHLORIDE 1000 ML: 9 INJECTION, SOLUTION INTRAVENOUS at 15:43

## 2018-01-04 NOTE — ED PROVIDER NOTES
Subjective   HPI Comments: 62 years old female with history of hypertension presented in the ER with near syncopal episode while she was at work.  Patient reports having on and off near-syncope/vasovagal near-syncopal episodes for quite some time but recently getting worse.  She started to feel weak warm and sweaty followed by generalized weakness and abdominal cramps.  She was about to pass out.  She also had blurry vision which lasted for few minutes and improved on its own on presentation in the ER.  It was bilateral.  She had no focal numbness tingling or weakness.  No fall or head injury reported.  She denies any chest pain, palpitations or shortness of breath before or after the episode.  Patient reports that she had similar episodes in the past and was thoroughly evaluated.  She has negative stress test done 1-1/2 month ago.  She was advised by Dr. Ponce to reduce dose of her lisinopril to 2.5 mg because her blood pressure was dropping and that might be the reason for her symptom but she restarted 10 mg again.  On presentation in the ER her blood pressure is 60/42.  She denies any abdominal pain at present.  No vomiting.  No other focal neuro symptoms at all.    Patient is a 62 y.o. female presenting with weakness.   History provided by:  Patient  Weakness - Generalized   Severity:  Moderate  Onset quality:  Sudden  Duration:  1 hour  Timing:  Constant  Progression:  Improving  Chronicity:  Recurrent  Relieved by:  Rest  Worsened by:  Activity  Ineffective treatments:  None tried  Associated symptoms: dizziness and near-syncope    Associated symptoms: no abdominal pain, no anorexia, no aphasia, no chest pain, no cough, no diarrhea, no difficulty walking, no drooling, no dysuria, no numbness in extremities, no falls, no fever, no foul-smelling urine, no headaches, no myalgias, no nausea, no seizures, no shortness of breath, no stroke symptoms, no syncope, no vision change and no vomiting        Review of  Systems   Constitutional: Negative for chills and fever.   HENT: Negative for congestion, drooling, facial swelling, rhinorrhea and sinus pressure.    Eyes: Negative for photophobia and visual disturbance.   Respiratory: Negative for cough, chest tightness, shortness of breath and wheezing.    Cardiovascular: Positive for near-syncope. Negative for chest pain and syncope.   Gastrointestinal: Negative for abdominal distention, abdominal pain, anorexia, diarrhea, nausea and vomiting.   Endocrine: Negative for polyuria.   Genitourinary: Negative for dysuria and flank pain.   Musculoskeletal: Negative for back pain, falls, joint swelling, myalgias and neck pain.   Skin: Negative for rash.   Neurological: Positive for dizziness. Negative for seizures, numbness and headaches.   Hematological: Negative for adenopathy.       Past Medical History:   Diagnosis Date   • Acute bronchitis    • Acute pharyngitis    • Allergic asthma     IgE-mediated allergic asthma      • Allergic rhinitis    • Asthmatic bronchitis    • Cough    • Diabetes mellitus screening    • Dizziness and giddiness    • Encounter for long-term current use of medication    • Essential hypertension    • Fatigue    • History of bone density study     DEXA (bone density) test, peripheral   • History of mammography, diagnostic    • Nuclear senile cataract    • Otalgia    • Shoulder pain     bursitis vs tendonitis      • Sprain of ankle, left    • Upper respiratory infection    • Vertigo    • Wheezing        No Known Allergies    Past Surgical History:   Procedure Laterality Date   • CARDIAC CATHETERIZATION  04/16/2008    Cardiac cath (negative)   • ENDOSCOPY N/A 3/9/2017    Procedure: ESOPHAGOGASTRODUODENOSCOPY possible dilation ;  Surgeon: Prince White MD;  Location: [facility] ENDOSCOPY;  Service:    • INJECTION OF MEDICATION  03/05/2013   • PAP SMEAR     • PELVIC FRACTURE SURGERY  1973    Anesth, repair fracture, pelvis   • TUBAL ABDOMINAL LIGATION          Family History   Problem Relation Age of Onset   • Asthma Mother    • Diabetes Mother    • Stroke Mother      cva in her 50's   • Cancer Father    • Diabetes Father    • Heart attack Father      mi in 50's   • No Known Problems Son        Social History     Social History   • Marital status:      Spouse name: N/A   • Number of children: N/A   • Years of education: N/A     Social History Main Topics   • Smoking status: Never Smoker   • Smokeless tobacco: Never Used   • Alcohol use No   • Drug use: No   • Sexual activity: Defer     Other Topics Concern   • None     Social History Narrative           Objective   Physical Exam   Constitutional: She is oriented to person, place, and time. She appears well-developed and well-nourished. No distress.   HENT:   Head: Normocephalic and atraumatic.   Nose: Nose normal.   Eyes: Conjunctivae and EOM are normal. Pupils are equal, round, and reactive to light.   Neck: Normal range of motion. Neck supple.   Cardiovascular: Normal rate, regular rhythm and normal heart sounds.    Pulmonary/Chest: Effort normal and breath sounds normal. No respiratory distress. She has no wheezes.   Abdominal: Soft. Bowel sounds are normal. There is no tenderness. There is no guarding.   Musculoskeletal: Normal range of motion. She exhibits no edema or deformity.   Neurological: She is alert and oriented to person, place, and time. She displays normal reflexes. No cranial nerve deficit. She exhibits normal muscle tone. Coordination normal.   Skin: Skin is warm and dry. No rash noted.   Psychiatric: Her mood appears anxious.   Nursing note and vitals reviewed.      ECG 12 Lead    Date/Time: 1/4/2018 6:25 PM  Performed by: ANTHONY COLMENARES  Authorized by: ANTHONY COLMENARES   Interpreted by physician  Rhythm: sinus rhythm  Rate: normal  QRS axis: normal  Conduction: conduction normal  ST Segments: ST segments normal  T Waves: T waves normal  Clinical impression: normal ECG               ED Course  ED  Course   Comment By Time   62 years old is evaluated for hypertension along with generalized weakness and near-syncope.  She is given IV fluid.  Workup showed mild dehydration with elevated lactate.  Chest x-rays negative for any acute cardiopulmonary finding.  CT head is negative for any acute into cranial finding.  Her pressure is improved.  She is recommended observation admission for hydration but patient does not want to stay as she had similar episodes in the past and car workup was done.  I have given her another bolus of fluid and will recheck lactated and if it's coming down patient would be discharged home with outpatient cardiology and primary care follow-up.  I think her symptoms could be secondary to taking too much lisinopril and recommended lower down to as per Dr. Ponce recommendations 2.5 mg daily which she will do it.  At this point patient is not in any distress at all, no toxic looking.  She is feeling back to her baseline.  She has negative orthostatics.  Patient is being discharged. Jac Leo MD 01/04 2155          Labs Reviewed   LACTIC ACID, PLASMA - Abnormal; Notable for the following:        Result Value    Lactate 2.6 (*)     All other components within normal limits   COMPREHENSIVE METABOLIC PANEL - Abnormal; Notable for the following:     Glucose 118 (*)     Creatinine 1.52 (*)     Calcium 10.4 (*)     eGFR Non African Amer 35 (*)     Anion Gap 16.0 (*)     All other components within normal limits   URINALYSIS W/ CULTURE IF INDICATED - Abnormal; Notable for the following:     Appearance, UA Cloudy (*)     Ketones, UA 15 mg/dL (1+) (*)     Protein, UA Trace (*)     Leuk Esterase, UA Trace (*)     All other components within normal limits   TSH - Abnormal; Notable for the following:     TSH 6.140 (*)     All other components within normal limits   CBC WITH AUTO DIFFERENTIAL - Abnormal; Notable for the following:     WBC 11.39 (*)     Immature Grans % 0.7 (*)     Monocytes, Absolute  0.94 (*)     Immature Grans, Absolute 0.08 (*)     All other components within normal limits   URINALYSIS, MICROSCOPIC ONLY - Abnormal; Notable for the following:     RBC, UA 0-2 (*)     WBC, UA 13-20 (*)     Bacteria, UA 1+ (*)     Squamous Epithelial Cells, UA 6-12 (*)     Mucus, UA Large/3+ (*)     All other components within normal limits   URINE CULTURE - Normal   LIPASE - Normal   TROPONIN (IN-HOUSE) - Normal   MAGNESIUM - Normal   LACTIC ACID, PLASMA - Normal   LACTIC ACID REFLEX TIMER   RAINBOW DRAW    Narrative:     The following orders were created for panel order [place] Draw.  Procedure                               Abnormality         Status                     ---------                               -----------         ------                     Light Blue Top[[id number]]                                   Final result               Green Top (Gel)[[id number]]                                                             Lavender Top[[id number]]                                     Final result               Gold Top - SST[[id number]]                                   Final result                 Please view results for these tests on the individual orders.   LIGHT BLUE TOP   LAVENDER TOP   GOLD TOP - SST   CBC AND DIFFERENTIAL    Narrative:     The following orders were created for panel order CBC & Differential.  Procedure                               Abnormality         Status                     ---------                               -----------         ------                     CBC Auto Differential[[id number]]        Abnormal            Final result                 Please view results for these tests on the individual orders.   GREEN TOP       Ct Head Without Contrast    Result Date: 1/4/2018  Narrative: CT Head Without Contrast History: Near syncope. Blurred vision. Vertigo. Axial scans of the brain were obtained without intravenous contrast.  Coronal and sagital reconstructions were preformed. This  exam was performed according to our departmental dose-optimization program, which includes automated exposure control, adjustment of the mA and/or kV according to patient size and/or use of iterative reconstruction technique. DLP: 1288.0 Comparison: August 26, 2017 Bone windows are unremarkable. Minimal fluid in each maxillary sinus, greater on the left. No hemorrhage. No mass. No abnormal areas of increased or decreased attenuation. No midline shift. No abnormal extra-axial fluid collections.     Impression: CONCLUSION: Normal nonenhanced CT of the brain. Bilateral acute maxillary sinusitis. [id number] Electronically signed by:  Ki Dietz MD  1/4/2018 5:30 PM CST Workstation: [email or web address] Chest 1 View    Result Date: 1/4/2018  Narrative: PROCEDURE: Chest, AP upright portable at 3:41 PM. INDICATION: Near syncope. COMPARISON: 8/26/2017 chest exam. Heart size normal with normal vascularity. Bilateral low lung volumes with no acute infiltrate or atelectasis. Chronic elevation right diaphragm unchanged dating to 2013 exam. No pleural effusions. Bony structures unremarkable.     Impression: Bilateral low lung volumes. No acute disease. [id number] Electronically signed by:  Pranav Masters MD  1/4/2018 4:31 PM CST Workstation: [facility]    Final diagnoses:   General weakness   Vasovagal near syncope   Hypotension, unspecified hypotension type   Dehydration            Jac Leo MD  01/05/18 0721

## 2018-01-05 NOTE — DISCHARGE INSTRUCTIONS
Drink plenty of fluids.  Decreased dose of lisinopril to 2.5 milligrams daily.  Follow-up with cardiology and primary care for reevaluation.  Return to ER for worsening.

## 2018-01-06 LAB
BACTERIA SPEC AEROBE CULT: NORMAL
BACTERIA SPEC AEROBE CULT: NORMAL

## 2018-03-27 ENCOUNTER — OFFICE VISIT (OUTPATIENT)
Dept: CARDIOLOGY | Facility: CLINIC | Age: 63
End: 2018-03-27

## 2018-03-27 VITALS
OXYGEN SATURATION: 98 % | HEIGHT: 60 IN | WEIGHT: 171.44 LBS | BODY MASS INDEX: 33.66 KG/M2 | DIASTOLIC BLOOD PRESSURE: 78 MMHG | HEART RATE: 52 BPM | SYSTOLIC BLOOD PRESSURE: 132 MMHG

## 2018-03-27 DIAGNOSIS — I10 ESSENTIAL HYPERTENSION: ICD-10-CM

## 2018-03-27 DIAGNOSIS — R55 SYNCOPE, VASOVAGAL: Primary | ICD-10-CM

## 2018-03-27 PROCEDURE — 99213 OFFICE O/P EST LOW 20 MIN: CPT | Performed by: INTERNAL MEDICINE

## 2018-03-27 NOTE — PROGRESS NOTES
[facility]  OFFICE NOTE    Gabriela Celaya  63 y.o. female    03/27/2018  1. Syncope, vasovagal    2. Essential hypertension        Chief complaint -Dizziness with near syncope possibly vasovagal      History of present Illness- 63-year-old lady who had an episode of syncope and an episode of discomfort in the chest area.  She has been doing well and she has been taking her lisinopril 10 mg .  I had changed her dose to 2.5 mg daily.  She had a echo which was unremarkable and a carotid duplex which showed less than 70% stenosis.  She denies any headache or visual complaints her EKG has been unremarkable.  She was actually taking still 10 mg and since she cut down to 2.5 mg she has not had any more episodes.  I asked her to increase her hydration and wear compression stockings.  Also start an exercise program to increase the vagal tone.      No Known Allergies      Past Medical History:   Diagnosis Date   • Acute bronchitis    • Acute pharyngitis    • Allergic asthma     IgE-mediated allergic asthma      • Allergic rhinitis    • Asthmatic bronchitis    • Cough    • Diabetes mellitus screening    • Dizziness and giddiness    • Encounter for long-term current use of medication    • Essential hypertension    • Fatigue    • History of bone density study     DEXA (bone density) test, peripheral   • History of mammography, diagnostic    • Nuclear senile cataract    • Otalgia    • Shoulder pain     bursitis vs tendonitis      • Sprain of ankle, left    • Upper respiratory infection    • Vertigo    • Wheezing          Past Surgical History:   Procedure Laterality Date   • CARDIAC CATHETERIZATION  04/16/2008    Cardiac cath (negative)   • ENDOSCOPY N/A 3/9/2017    Procedure: ESOPHAGOGASTRODUODENOSCOPY possible dilation ;  Surgeon: Prince White MD;  Location: [facility] ENDOSCOPY;  Service:    • INJECTION OF MEDICATION  03/05/2013   • PAP SMEAR     • PELVIC FRACTURE SURGERY  1973    Anesth,  repair fracture, pelvis   • TUBAL ABDOMINAL LIGATION           Family History   Problem Relation Age of Onset   • Asthma Mother    • Diabetes Mother    • Stroke Mother      cva in her 50's   • Cancer Father    • Diabetes Father    • Heart attack Father      mi in 50's   • No Known Problems Son          Social History     Social History   • Marital status:      Spouse name: N/A   • Number of children: N/A   • Years of education: N/A     Occupational History   • Not on file.     Social History Main Topics   • Smoking status: Never Smoker   • Smokeless tobacco: Never Used   • Alcohol use No   • Drug use: No   • Sexual activity: Defer     Other Topics Concern   • Not on file     Social History Narrative   • No narrative on file         Current Outpatient Prescriptions   Medication Sig Dispense Refill   • aspirin 81 MG tablet Take 81 mg by mouth Daily.     • lisinopril (PRINIVIL,ZESTRIL) 2.5 MG tablet Take 1 tablet by mouth Daily. (Patient taking differently: Take 10 mg by mouth Daily.) 90 tablet 3   • vitamin D (ERGOCALCIFEROL) 34812 UNITS capsule capsule Take 50,000 Units by mouth Every 30 (Thirty) Days.       No current facility-administered medications for this visit.          Review of Systems     Constitution: Denies any fatigue, fever or chills    HENT: Denies any headache, hearing impairment,     Eyes: Denies any blurring of vision, or photophobia     Cardivascular - As per history of present illness     Respiratory system-denies any COPD, shortness of breath,   sleep apnea.     Endocrine:  No history of hyperlipidemia, diabetes,                      Hypothyrodism       Musculoskeletal:  No history of arthritis with musculoskeletal problems    Gastrointestinal: No nausea, vomiting, or melena    Genitourinary: No dysuria or hematuria    Neurological:   No history of seizure disorder, stroke, memory problems    Psychiatric/Behavioral:        No history of depression,  No history of bipolar disorder or  "schizophrenia     Hematological- no history of easy bruising or any bleeding diathesis            OBJECTIVE    /78 (BP Location: Left arm, Patient Position: Sitting, Cuff Size: Adult)   Pulse 52   Ht 152.4 cm (60\")   Wt 77.8 kg (171 lb 7 oz)   LMP  (LMP Unknown)   SpO2 98%   Breastfeeding? No   BMI 33.48 kg/m²       Physical Exam     Constitutional: is oriented to person, place, and time.     Skin-warm and dry    Well developed and nourished in no acute distress      Head: Normocephalic and atraumatic.     Eyes: Pupils are equal, round, and reactive to light.     Neck: Neck supple. No bruit in the carotids, no elevation of JVD    Cardiovascular: [place] in the fifth intercostal space   Regular rate, and  Rhythm,    S1 greater than S2, no S3 or S4, no gallop     Pulmonary/Chest:   Air  Entry is equal on both sides  No wheezing or crackles,      Abdominal: Soft.  No hepatosplenomegaly, bowel sounds are present    Musculoskeletal: No kyphoscoliosis, no significant thickening of the joints    Neurological: is alert and oriented to person, place, and time.    cranial nerve are intact .   No motor or sensory deficit    Extremities-no edema, no radial femoral delay      Psychiatric: He has a normal mood and affect.                  His behavior is normal.           Procedures      Lab Results   Component Value Date    WBC 11.39 (H) 01/04/2018    HGB 14.3 01/04/2018    HCT 41.2 01/04/2018    MCV 89.0 01/04/2018     01/04/2018     Lab Results   Component Value Date    GLUCOSE 118 (H) 01/04/2018    BUN 18 01/04/2018    CREATININE 1.52 (H) 01/04/2018    EGFRIFNONA 35 (L) 01/04/2018    BCR 11.8 01/04/2018    CO2 24.0 01/04/2018    CALCIUM 10.4 (H) 01/04/2018    ALBUMIN 4.70 01/04/2018    LABIL2 1.6 01/04/2018    AST 27 01/04/2018    ALT 43 01/04/2018     Lab Results   Component Value Date    TSH 6.140 (H) 01/04/2018                  A/P    Dizziness with near syncope-carotid duplex and echo are unremarkable,She " was actually taking 10 mg daily and since she has been on 2.5 mg daily her blood pressure has been good and no more episodes of dizziness or near syncope.  Asked her to increase her fluid intake.  And wear compression stockings.    Mild hypertension continue lisinopril 2.5 mg daily.  And she takes an aspirin every day.    Discussed about exercise and improving her functional capacity        This document has been electronically signed by Kris Rendon MD on March 27, 2018 3:16 PM       EMR Dragon/Transcription disclaimer:   Some of this note may be an electronic transcription/translation of spoken language to printed text. The electronic translation of spoken language may permit erroneous, or at times, nonsensical words or phrases to be inadvertently transcribed; Although I have reviewed the note for such errors, some may still exist.

## 2020-09-24 ENCOUNTER — HOSPITAL ENCOUNTER (OUTPATIENT)
Facility: HOSPITAL | Age: 65
Setting detail: OBSERVATION
Discharge: HOME | End: 2020-09-27
Attending: EMERGENCY MEDICINE | Admitting: INTERNAL MEDICINE

## 2020-09-24 ENCOUNTER — APPOINTMENT (OUTPATIENT)
Dept: CT IMAGING | Facility: HOSPITAL | Age: 65
End: 2020-09-24

## 2020-09-24 DIAGNOSIS — K62.5 BRBPR (BRIGHT RED BLOOD PER RECTUM): ICD-10-CM

## 2020-09-24 DIAGNOSIS — K52.9 COLITIS: Primary | ICD-10-CM

## 2020-09-24 DIAGNOSIS — R31.9 HEMATURIA, UNSPECIFIED TYPE: ICD-10-CM

## 2020-09-24 LAB
ADV 40+41 DNA STL QL NAA+NON-PROBE: NOT DETECTED
ALBUMIN SERPL-MCNC: 4.3 G/DL (ref 3.5–5.2)
ALBUMIN/GLOB SERPL: 1.4 G/DL
ALP SERPL-CCNC: 94 U/L (ref 39–117)
ALT SERPL W P-5'-P-CCNC: 20 U/L (ref 1–33)
ANION GAP SERPL CALCULATED.3IONS-SCNC: 12 MMOL/L (ref 5–15)
APTT PPP: 27.5 SECONDS (ref 20–40.3)
AST SERPL-CCNC: 27 U/L (ref 1–32)
ASTRO TYP 1-8 RNA STL QL NAA+NON-PROBE: NOT DETECTED
BACTERIA UR QL AUTO: ABNORMAL /HPF
BASOPHILS # BLD AUTO: 0.05 10*3/MM3 (ref 0–0.2)
BASOPHILS NFR BLD AUTO: 0.3 % (ref 0–1.5)
BILIRUB SERPL-MCNC: 0.8 MG/DL (ref 0–1.2)
BILIRUB UR QL STRIP: ABNORMAL
BUN SERPL-MCNC: 15 MG/DL (ref 8–23)
BUN/CREAT SERPL: 19.7 (ref 7–25)
C CAYETANENSIS DNA STL QL NAA+NON-PROBE: NOT DETECTED
C DIFF TOX GENS STL QL NAA+PROBE: NEGATIVE
CALCIUM SPEC-SCNC: 9.7 MG/DL (ref 8.6–10.5)
CAMPY SP DNA.DIARRHEA STL QL NAA+PROBE: NOT DETECTED
CHLORIDE SERPL-SCNC: 99 MMOL/L (ref 98–107)
CLARITY UR: ABNORMAL
CO2 SERPL-SCNC: 27 MMOL/L (ref 22–29)
COLOR UR: ABNORMAL
CREAT SERPL-MCNC: 0.76 MG/DL (ref 0.57–1)
CRYPTOSP STL CULT: NOT DETECTED
DEPRECATED RDW RBC AUTO: 42.5 FL (ref 37–54)
E COLI DNA SPEC QL NAA+PROBE: NOT DETECTED
E HISTOLYT AG STL-ACNC: NOT DETECTED
EAEC PAA PLAS AGGR+AATA ST NAA+NON-PRB: NOT DETECTED
EC STX1 + STX2 GENES STL NAA+PROBE: NOT DETECTED
EOSINOPHIL # BLD AUTO: 0.08 10*3/MM3 (ref 0–0.4)
EOSINOPHIL NFR BLD AUTO: 0.4 % (ref 0.3–6.2)
EPEC EAE GENE STL QL NAA+NON-PROBE: NOT DETECTED
ERYTHROCYTE [DISTWIDTH] IN BLOOD BY AUTOMATED COUNT: 12.2 % (ref 12.3–15.4)
ETEC LTA+ST1A+ST1B TOX ST NAA+NON-PROBE: NOT DETECTED
G LAMBLIA DNA SPEC QL NAA+PROBE: NOT DETECTED
GFR SERPL CREATININE-BSD FRML MDRD: 77 ML/MIN/1.73
GLOBULIN UR ELPH-MCNC: 3.1 GM/DL
GLUCOSE SERPL-MCNC: 117 MG/DL (ref 65–99)
GLUCOSE UR STRIP-MCNC: NEGATIVE MG/DL
HCT VFR BLD AUTO: 41.4 % (ref 34–46.6)
HEMOCCULT STL QL: POSITIVE
HGB BLD-MCNC: 13.9 G/DL (ref 12–15.9)
HGB UR QL STRIP.AUTO: ABNORMAL
HOLD SPECIMEN: NORMAL
HOLD SPECIMEN: NORMAL
HYALINE CASTS UR QL AUTO: ABNORMAL /LPF
IMM GRANULOCYTES # BLD AUTO: 0.09 10*3/MM3 (ref 0–0.05)
IMM GRANULOCYTES NFR BLD AUTO: 0.5 % (ref 0–0.5)
INR PPP: 0.92 (ref 0.8–1.2)
KETONES UR QL STRIP: ABNORMAL
LACTOFERRIN STL QL LA: POSITIVE
LEUKOCYTE ESTERASE UR QL STRIP.AUTO: ABNORMAL
LIPASE SERPL-CCNC: 15 U/L (ref 13–60)
LYMPHOCYTES # BLD AUTO: 1.55 10*3/MM3 (ref 0.7–3.1)
LYMPHOCYTES NFR BLD AUTO: 8.2 % (ref 19.6–45.3)
MCH RBC QN AUTO: 31.7 PG (ref 26.6–33)
MCHC RBC AUTO-ENTMCNC: 33.6 G/DL (ref 31.5–35.7)
MCV RBC AUTO: 94.5 FL (ref 79–97)
MONOCYTES # BLD AUTO: 1.17 10*3/MM3 (ref 0.1–0.9)
MONOCYTES NFR BLD AUTO: 6.2 % (ref 5–12)
NEUTROPHILS NFR BLD AUTO: 16.03 10*3/MM3 (ref 1.7–7)
NEUTROPHILS NFR BLD AUTO: 84.4 % (ref 42.7–76)
NITRITE UR QL STRIP: NEGATIVE
NOROVIRUS GI+II RNA STL QL NAA+NON-PROBE: NOT DETECTED
NRBC BLD AUTO-RTO: 0 /100 WBC (ref 0–0.2)
P SHIGELLOIDES DNA STL QL NAA+PROBE: NOT DETECTED
PH UR STRIP.AUTO: 5.5 [PH] (ref 5–9)
PLATELET # BLD AUTO: 282 10*3/MM3 (ref 140–450)
PMV BLD AUTO: 9.1 FL (ref 6–12)
POTASSIUM SERPL-SCNC: 3.7 MMOL/L (ref 3.5–5.2)
PROT SERPL-MCNC: 7.4 G/DL (ref 6–8.5)
PROT UR QL STRIP: ABNORMAL
PROTHROMBIN TIME: 12.7 SECONDS (ref 11.1–15.3)
RBC # BLD AUTO: 4.38 10*6/MM3 (ref 3.77–5.28)
RBC # UR: ABNORMAL /HPF
REF LAB TEST METHOD: ABNORMAL
RV RNA STL NAA+PROBE: NOT DETECTED
SALMONELLA DNA SPEC QL NAA+PROBE: NOT DETECTED
SAPO I+II+IV+V RNA STL QL NAA+NON-PROBE: NOT DETECTED
SARS-COV-2 N GENE RESP QL NAA+PROBE: NOT DETECTED
SHIGELLA SP+EIEC IPAH STL QL NAA+PROBE: NOT DETECTED
SODIUM SERPL-SCNC: 138 MMOL/L (ref 136–145)
SP GR UR STRIP: 1.03 (ref 1–1.03)
SQUAMOUS #/AREA URNS HPF: ABNORMAL /HPF
UROBILINOGEN UR QL STRIP: ABNORMAL
V CHOLERAE DNA SPEC QL NAA+PROBE: NOT DETECTED
VIBRIO DNA SPEC NAA+PROBE: NOT DETECTED
WBC # BLD AUTO: 18.97 10*3/MM3 (ref 3.4–10.8)
WBC UR QL AUTO: ABNORMAL /HPF
WHOLE BLOOD HOLD SPECIMEN: NORMAL
WHOLE BLOOD HOLD SPECIMEN: NORMAL
YERSINIA STL CULT: NOT DETECTED

## 2020-09-24 PROCEDURE — 0 DIATRIZOATE MEGLUMINE & SODIUM PER 1 ML: Performed by: EMERGENCY MEDICINE

## 2020-09-24 PROCEDURE — 99285 EMERGENCY DEPT VISIT HI MDM: CPT

## 2020-09-24 PROCEDURE — 96361 HYDRATE IV INFUSION ADD-ON: CPT

## 2020-09-24 PROCEDURE — C9803 HOPD COVID-19 SPEC COLLECT: HCPCS

## 2020-09-24 PROCEDURE — 82272 OCCULT BLD FECES 1-3 TESTS: CPT | Performed by: FAMILY MEDICINE

## 2020-09-24 PROCEDURE — 25010000002 AZITHROMYCIN PER 500 MG: Performed by: EMERGENCY MEDICINE

## 2020-09-24 PROCEDURE — 25010000002 LEVOFLOXACIN PER 250 MG: Performed by: FAMILY MEDICINE

## 2020-09-24 PROCEDURE — 81001 URINALYSIS AUTO W/SCOPE: CPT | Performed by: EMERGENCY MEDICINE

## 2020-09-24 PROCEDURE — 85025 COMPLETE CBC W/AUTO DIFF WBC: CPT | Performed by: EMERGENCY MEDICINE

## 2020-09-24 PROCEDURE — 87045 FECES CULTURE AEROBIC BACT: CPT | Performed by: FAMILY MEDICINE

## 2020-09-24 PROCEDURE — 83690 ASSAY OF LIPASE: CPT | Performed by: EMERGENCY MEDICINE

## 2020-09-24 PROCEDURE — 96365 THER/PROPH/DIAG IV INF INIT: CPT

## 2020-09-24 PROCEDURE — 87635 SARS-COV-2 COVID-19 AMP PRB: CPT | Performed by: EMERGENCY MEDICINE

## 2020-09-24 PROCEDURE — G0378 HOSPITAL OBSERVATION PER HR: HCPCS

## 2020-09-24 PROCEDURE — 80053 COMPREHEN METABOLIC PANEL: CPT | Performed by: EMERGENCY MEDICINE

## 2020-09-24 PROCEDURE — 87046 STOOL CULTR AEROBIC BACT EA: CPT | Performed by: FAMILY MEDICINE

## 2020-09-24 PROCEDURE — 0097U HC BIOFIRE FILMARRAY GI PANEL: CPT | Performed by: EMERGENCY MEDICINE

## 2020-09-24 PROCEDURE — 87427 SHIGA-LIKE TOXIN AG IA: CPT | Performed by: FAMILY MEDICINE

## 2020-09-24 PROCEDURE — 96367 TX/PROPH/DG ADDL SEQ IV INF: CPT

## 2020-09-24 PROCEDURE — 74177 CT ABD & PELVIS W/CONTRAST: CPT

## 2020-09-24 PROCEDURE — 25010000002 IOPAMIDOL 61 % SOLUTION: Performed by: EMERGENCY MEDICINE

## 2020-09-24 PROCEDURE — 96366 THER/PROPH/DIAG IV INF ADDON: CPT

## 2020-09-24 PROCEDURE — 85610 PROTHROMBIN TIME: CPT | Performed by: EMERGENCY MEDICINE

## 2020-09-24 PROCEDURE — 83630 LACTOFERRIN FECAL (QUAL): CPT | Performed by: FAMILY MEDICINE

## 2020-09-24 PROCEDURE — 87040 BLOOD CULTURE FOR BACTERIA: CPT | Performed by: FAMILY MEDICINE

## 2020-09-24 PROCEDURE — 85730 THROMBOPLASTIN TIME PARTIAL: CPT | Performed by: EMERGENCY MEDICINE

## 2020-09-24 PROCEDURE — 87493 C DIFF AMPLIFIED PROBE: CPT | Performed by: FAMILY MEDICINE

## 2020-09-24 RX ORDER — SODIUM CHLORIDE 0.9 % (FLUSH) 0.9 %
10 SYRINGE (ML) INJECTION AS NEEDED
Status: DISCONTINUED | OUTPATIENT
Start: 2020-09-24 | End: 2020-09-27 | Stop reason: HOSPADM

## 2020-09-24 RX ORDER — ONDANSETRON 2 MG/ML
4 INJECTION INTRAMUSCULAR; INTRAVENOUS EVERY 6 HOURS PRN
Status: DISCONTINUED | OUTPATIENT
Start: 2020-09-24 | End: 2020-09-27 | Stop reason: HOSPADM

## 2020-09-24 RX ORDER — LEVOFLOXACIN 5 MG/ML
500 INJECTION, SOLUTION INTRAVENOUS EVERY 24 HOURS
Status: DISCONTINUED | OUTPATIENT
Start: 2020-09-24 | End: 2020-09-27 | Stop reason: HOSPADM

## 2020-09-24 RX ORDER — SODIUM CHLORIDE 9 MG/ML
125 INJECTION, SOLUTION INTRAVENOUS CONTINUOUS
Status: DISCONTINUED | OUTPATIENT
Start: 2020-09-24 | End: 2020-09-27 | Stop reason: HOSPADM

## 2020-09-24 RX ORDER — SODIUM CHLORIDE 0.9 % (FLUSH) 0.9 %
10 SYRINGE (ML) INJECTION EVERY 12 HOURS SCHEDULED
Status: DISCONTINUED | OUTPATIENT
Start: 2020-09-24 | End: 2020-09-27 | Stop reason: HOSPADM

## 2020-09-24 RX ORDER — MORPHINE SULFATE 2 MG/ML
2 INJECTION, SOLUTION INTRAMUSCULAR; INTRAVENOUS EVERY 4 HOURS PRN
Status: DISCONTINUED | OUTPATIENT
Start: 2020-09-24 | End: 2020-09-27 | Stop reason: HOSPADM

## 2020-09-24 RX ORDER — ACETAMINOPHEN 325 MG/1
650 TABLET ORAL EVERY 4 HOURS PRN
Status: DISCONTINUED | OUTPATIENT
Start: 2020-09-24 | End: 2020-09-27 | Stop reason: HOSPADM

## 2020-09-24 RX ORDER — LISINOPRIL 2.5 MG/1
2.5 TABLET ORAL DAILY
Status: DISCONTINUED | OUTPATIENT
Start: 2020-09-24 | End: 2020-09-27 | Stop reason: HOSPADM

## 2020-09-24 RX ORDER — SODIUM CHLORIDE 9 MG/ML
125 INJECTION, SOLUTION INTRAVENOUS CONTINUOUS
Status: DISCONTINUED | OUTPATIENT
Start: 2020-09-24 | End: 2020-09-26 | Stop reason: SDUPTHER

## 2020-09-24 RX ADMIN — AZITHROMYCIN MONOHYDRATE 500 MG: 500 INJECTION, POWDER, LYOPHILIZED, FOR SOLUTION INTRAVENOUS at 13:17

## 2020-09-24 RX ADMIN — LEVOFLOXACIN 500 MG: 5 INJECTION, SOLUTION INTRAVENOUS at 16:09

## 2020-09-24 RX ADMIN — SODIUM CHLORIDE 500 ML: 9 INJECTION, SOLUTION INTRAVENOUS at 08:29

## 2020-09-24 RX ADMIN — METRONIDAZOLE 500 MG: 500 INJECTION, SOLUTION INTRAVENOUS at 11:24

## 2020-09-24 RX ADMIN — SODIUM CHLORIDE, PRESERVATIVE FREE 10 ML: 5 INJECTION INTRAVENOUS at 08:28

## 2020-09-24 RX ADMIN — DIATRIZOATE MEGLUMINE AND DIATRIZOATE SODIUM 30 ML: 660; 100 LIQUID ORAL; RECTAL at 10:08

## 2020-09-24 RX ADMIN — SODIUM CHLORIDE, PRESERVATIVE FREE 10 ML: 5 INJECTION INTRAVENOUS at 21:26

## 2020-09-24 RX ADMIN — IOPAMIDOL 90 ML: 612 INJECTION, SOLUTION INTRAVENOUS at 10:08

## 2020-09-24 RX ADMIN — SODIUM CHLORIDE 125 ML/HR: 9 INJECTION, SOLUTION INTRAVENOUS at 22:11

## 2020-09-24 RX ADMIN — METRONIDAZOLE 500 MG: 500 INJECTION, SOLUTION INTRAVENOUS at 17:19

## 2020-09-24 RX ADMIN — SODIUM CHLORIDE 125 ML/HR: 9 INJECTION, SOLUTION INTRAVENOUS at 08:29

## 2020-09-24 RX ADMIN — METRONIDAZOLE 500 MG: 500 INJECTION, SOLUTION INTRAVENOUS at 22:11

## 2020-09-25 LAB
ANION GAP SERPL CALCULATED.3IONS-SCNC: 10 MMOL/L (ref 5–15)
BASOPHILS # BLD AUTO: 0.04 10*3/MM3 (ref 0–0.2)
BASOPHILS NFR BLD AUTO: 0.3 % (ref 0–1.5)
BUN SERPL-MCNC: 10 MG/DL (ref 8–23)
BUN/CREAT SERPL: 17.2 (ref 7–25)
CALCIUM SPEC-SCNC: 8.2 MG/DL (ref 8.6–10.5)
CHLORIDE SERPL-SCNC: 107 MMOL/L (ref 98–107)
CO2 SERPL-SCNC: 23 MMOL/L (ref 22–29)
CREAT SERPL-MCNC: 0.58 MG/DL (ref 0.57–1)
DEPRECATED RDW RBC AUTO: 42.4 FL (ref 37–54)
EOSINOPHIL # BLD AUTO: 0.27 10*3/MM3 (ref 0–0.4)
EOSINOPHIL NFR BLD AUTO: 2.1 % (ref 0.3–6.2)
ERYTHROCYTE [DISTWIDTH] IN BLOOD BY AUTOMATED COUNT: 12.1 % (ref 12.3–15.4)
GFR SERPL CREATININE-BSD FRML MDRD: 105 ML/MIN/1.73
GLUCOSE SERPL-MCNC: 76 MG/DL (ref 65–99)
HCT VFR BLD AUTO: 33.8 % (ref 34–46.6)
HGB BLD-MCNC: 11.3 G/DL (ref 12–15.9)
IMM GRANULOCYTES # BLD AUTO: 0.08 10*3/MM3 (ref 0–0.05)
IMM GRANULOCYTES NFR BLD AUTO: 0.6 % (ref 0–0.5)
LYMPHOCYTES # BLD AUTO: 1.43 10*3/MM3 (ref 0.7–3.1)
LYMPHOCYTES NFR BLD AUTO: 11 % (ref 19.6–45.3)
MCH RBC QN AUTO: 31.6 PG (ref 26.6–33)
MCHC RBC AUTO-ENTMCNC: 33.4 G/DL (ref 31.5–35.7)
MCV RBC AUTO: 94.4 FL (ref 79–97)
MONOCYTES # BLD AUTO: 0.92 10*3/MM3 (ref 0.1–0.9)
MONOCYTES NFR BLD AUTO: 7.1 % (ref 5–12)
NEUTROPHILS NFR BLD AUTO: 10.26 10*3/MM3 (ref 1.7–7)
NEUTROPHILS NFR BLD AUTO: 78.9 % (ref 42.7–76)
NRBC BLD AUTO-RTO: 0 /100 WBC (ref 0–0.2)
PLATELET # BLD AUTO: 228 10*3/MM3 (ref 140–450)
PMV BLD AUTO: 9.3 FL (ref 6–12)
POTASSIUM SERPL-SCNC: 3.6 MMOL/L (ref 3.5–5.2)
RBC # BLD AUTO: 3.58 10*6/MM3 (ref 3.77–5.28)
SODIUM SERPL-SCNC: 140 MMOL/L (ref 136–145)
WBC # BLD AUTO: 13 10*3/MM3 (ref 3.4–10.8)

## 2020-09-25 PROCEDURE — 85025 COMPLETE CBC W/AUTO DIFF WBC: CPT | Performed by: FAMILY MEDICINE

## 2020-09-25 PROCEDURE — 96366 THER/PROPH/DIAG IV INF ADDON: CPT

## 2020-09-25 PROCEDURE — 25010000002 LEVOFLOXACIN PER 250 MG: Performed by: FAMILY MEDICINE

## 2020-09-25 PROCEDURE — G0378 HOSPITAL OBSERVATION PER HR: HCPCS

## 2020-09-25 PROCEDURE — 99212 OFFICE O/P EST SF 10 MIN: CPT | Performed by: INTERNAL MEDICINE

## 2020-09-25 PROCEDURE — 80048 BASIC METABOLIC PNL TOTAL CA: CPT | Performed by: FAMILY MEDICINE

## 2020-09-25 PROCEDURE — 96361 HYDRATE IV INFUSION ADD-ON: CPT

## 2020-09-25 RX ADMIN — METRONIDAZOLE 500 MG: 500 INJECTION, SOLUTION INTRAVENOUS at 10:50

## 2020-09-25 RX ADMIN — METRONIDAZOLE 500 MG: 500 INJECTION, SOLUTION INTRAVENOUS at 05:39

## 2020-09-25 RX ADMIN — SODIUM CHLORIDE, PRESERVATIVE FREE 10 ML: 5 INJECTION INTRAVENOUS at 08:23

## 2020-09-25 RX ADMIN — LISINOPRIL 2.5 MG: 2.5 TABLET ORAL at 08:23

## 2020-09-25 RX ADMIN — LEVOFLOXACIN 500 MG: 5 INJECTION, SOLUTION INTRAVENOUS at 16:15

## 2020-09-25 RX ADMIN — SODIUM CHLORIDE, PRESERVATIVE FREE 10 ML: 5 INJECTION INTRAVENOUS at 20:35

## 2020-09-25 RX ADMIN — SODIUM CHLORIDE 125 ML/HR: 9 INJECTION, SOLUTION INTRAVENOUS at 23:19

## 2020-09-25 RX ADMIN — SODIUM CHLORIDE 125 ML/HR: 9 INJECTION, SOLUTION INTRAVENOUS at 08:23

## 2020-09-25 RX ADMIN — METRONIDAZOLE 500 MG: 500 INJECTION, SOLUTION INTRAVENOUS at 23:19

## 2020-09-25 RX ADMIN — METRONIDAZOLE 500 MG: 500 INJECTION, SOLUTION INTRAVENOUS at 17:21

## 2020-09-26 LAB
ANION GAP SERPL CALCULATED.3IONS-SCNC: 10 MMOL/L (ref 5–15)
BUN SERPL-MCNC: 5 MG/DL (ref 8–23)
BUN/CREAT SERPL: 9.1 (ref 7–25)
CALCIUM SPEC-SCNC: 8.7 MG/DL (ref 8.6–10.5)
CHLORIDE SERPL-SCNC: 106 MMOL/L (ref 98–107)
CO2 SERPL-SCNC: 22 MMOL/L (ref 22–29)
CREAT SERPL-MCNC: 0.55 MG/DL (ref 0.57–1)
DEPRECATED RDW RBC AUTO: 40.3 FL (ref 37–54)
ERYTHROCYTE [DISTWIDTH] IN BLOOD BY AUTOMATED COUNT: 11.9 % (ref 12.3–15.4)
GFR SERPL CREATININE-BSD FRML MDRD: 112 ML/MIN/1.73
GLUCOSE SERPL-MCNC: 89 MG/DL (ref 65–99)
HCT VFR BLD AUTO: 32.1 % (ref 34–46.6)
HGB BLD-MCNC: 11 G/DL (ref 12–15.9)
MCH RBC QN AUTO: 31.6 PG (ref 26.6–33)
MCHC RBC AUTO-ENTMCNC: 34.3 G/DL (ref 31.5–35.7)
MCV RBC AUTO: 92.2 FL (ref 79–97)
PLATELET # BLD AUTO: 261 10*3/MM3 (ref 140–450)
PMV BLD AUTO: 9.1 FL (ref 6–12)
POTASSIUM SERPL-SCNC: 3.2 MMOL/L (ref 3.5–5.2)
RBC # BLD AUTO: 3.48 10*6/MM3 (ref 3.77–5.28)
SODIUM SERPL-SCNC: 138 MMOL/L (ref 136–145)
WBC # BLD AUTO: 8.39 10*3/MM3 (ref 3.4–10.8)

## 2020-09-26 PROCEDURE — 80048 BASIC METABOLIC PNL TOTAL CA: CPT | Performed by: NURSE PRACTITIONER

## 2020-09-26 PROCEDURE — 96375 TX/PRO/DX INJ NEW DRUG ADDON: CPT

## 2020-09-26 PROCEDURE — 96366 THER/PROPH/DIAG IV INF ADDON: CPT

## 2020-09-26 PROCEDURE — 25010000002 ONDANSETRON PER 1 MG: Performed by: FAMILY MEDICINE

## 2020-09-26 PROCEDURE — 85027 COMPLETE CBC AUTOMATED: CPT | Performed by: NURSE PRACTITIONER

## 2020-09-26 PROCEDURE — 96361 HYDRATE IV INFUSION ADD-ON: CPT

## 2020-09-26 PROCEDURE — 25010000002 LEVOFLOXACIN PER 250 MG: Performed by: FAMILY MEDICINE

## 2020-09-26 PROCEDURE — G0378 HOSPITAL OBSERVATION PER HR: HCPCS

## 2020-09-26 PROCEDURE — 99212 OFFICE O/P EST SF 10 MIN: CPT | Performed by: INTERNAL MEDICINE

## 2020-09-26 RX ORDER — POTASSIUM CHLORIDE 750 MG/1
40 CAPSULE, EXTENDED RELEASE ORAL ONCE
Status: COMPLETED | OUTPATIENT
Start: 2020-09-26 | End: 2020-09-26

## 2020-09-26 RX ORDER — LANOLIN ALCOHOL/MO/W.PET/CERES
3 CREAM (GRAM) TOPICAL NIGHTLY PRN
Status: DISCONTINUED | OUTPATIENT
Start: 2020-09-26 | End: 2020-09-27 | Stop reason: HOSPADM

## 2020-09-26 RX ADMIN — METRONIDAZOLE 500 MG: 500 INJECTION, SOLUTION INTRAVENOUS at 05:44

## 2020-09-26 RX ADMIN — POTASSIUM CHLORIDE 40 MEQ: 10 CAPSULE, COATED, EXTENDED RELEASE ORAL at 13:44

## 2020-09-26 RX ADMIN — SODIUM CHLORIDE 125 ML/HR: 9 INJECTION, SOLUTION INTRAVENOUS at 11:00

## 2020-09-26 RX ADMIN — MELATONIN 3 MG: 3 TAB ORAL at 21:48

## 2020-09-26 RX ADMIN — LISINOPRIL 2.5 MG: 2.5 TABLET ORAL at 08:02

## 2020-09-26 RX ADMIN — ONDANSETRON 4 MG: 2 INJECTION INTRAMUSCULAR; INTRAVENOUS at 15:04

## 2020-09-26 RX ADMIN — SODIUM CHLORIDE, PRESERVATIVE FREE 10 ML: 5 INJECTION INTRAVENOUS at 22:24

## 2020-09-26 RX ADMIN — LEVOFLOXACIN 500 MG: 5 INJECTION, SOLUTION INTRAVENOUS at 15:05

## 2020-09-27 VITALS
OXYGEN SATURATION: 94 % | HEART RATE: 71 BPM | BODY MASS INDEX: 31.8 KG/M2 | HEIGHT: 60 IN | SYSTOLIC BLOOD PRESSURE: 127 MMHG | WEIGHT: 162 LBS | TEMPERATURE: 96.9 F | DIASTOLIC BLOOD PRESSURE: 84 MMHG | RESPIRATION RATE: 18 BRPM

## 2020-09-27 LAB
ANION GAP SERPL CALCULATED.3IONS-SCNC: 9 MMOL/L (ref 5–15)
BUN SERPL-MCNC: 4 MG/DL (ref 8–23)
BUN/CREAT SERPL: 7.4 (ref 7–25)
CALCIUM SPEC-SCNC: 8.8 MG/DL (ref 8.6–10.5)
CHLORIDE SERPL-SCNC: 109 MMOL/L (ref 98–107)
CO2 SERPL-SCNC: 23 MMOL/L (ref 22–29)
CREAT SERPL-MCNC: 0.54 MG/DL (ref 0.57–1)
DEPRECATED RDW RBC AUTO: 40.8 FL (ref 37–54)
ERYTHROCYTE [DISTWIDTH] IN BLOOD BY AUTOMATED COUNT: 12 % (ref 12.3–15.4)
GFR SERPL CREATININE-BSD FRML MDRD: 114 ML/MIN/1.73
GLUCOSE SERPL-MCNC: 90 MG/DL (ref 65–99)
HCT VFR BLD AUTO: 30.5 % (ref 34–46.6)
HGB BLD-MCNC: 10.5 G/DL (ref 12–15.9)
MCH RBC QN AUTO: 31.7 PG (ref 26.6–33)
MCHC RBC AUTO-ENTMCNC: 34.4 G/DL (ref 31.5–35.7)
MCV RBC AUTO: 92.1 FL (ref 79–97)
PLATELET # BLD AUTO: 270 10*3/MM3 (ref 140–450)
PMV BLD AUTO: 9.1 FL (ref 6–12)
POTASSIUM SERPL-SCNC: 3.6 MMOL/L (ref 3.5–5.2)
RBC # BLD AUTO: 3.31 10*6/MM3 (ref 3.77–5.28)
SODIUM SERPL-SCNC: 141 MMOL/L (ref 136–145)
WBC # BLD AUTO: 6.46 10*3/MM3 (ref 3.4–10.8)

## 2020-09-27 PROCEDURE — 80048 BASIC METABOLIC PNL TOTAL CA: CPT | Performed by: NURSE PRACTITIONER

## 2020-09-27 PROCEDURE — G0378 HOSPITAL OBSERVATION PER HR: HCPCS

## 2020-09-27 PROCEDURE — 85027 COMPLETE CBC AUTOMATED: CPT | Performed by: NURSE PRACTITIONER

## 2020-09-27 RX ORDER — METRONIDAZOLE 500 MG/1
500 TABLET ORAL 3 TIMES DAILY
Qty: 12 TABLET | Refills: 0 | Status: SHIPPED | OUTPATIENT
Start: 2020-09-27 | End: 2020-10-01

## 2020-09-27 RX ORDER — ONDANSETRON 4 MG/1
4 TABLET, ORALLY DISINTEGRATING ORAL EVERY 8 HOURS PRN
Qty: 30 TABLET | Refills: 0 | Status: SHIPPED | OUTPATIENT
Start: 2020-09-27 | End: 2020-12-16

## 2020-09-27 RX ORDER — LEVOFLOXACIN 500 MG/1
500 TABLET, FILM COATED ORAL DAILY
Qty: 4 TABLET | Refills: 0 | Status: SHIPPED | OUTPATIENT
Start: 2020-09-27 | End: 2020-10-01

## 2020-09-27 RX ADMIN — LISINOPRIL 2.5 MG: 2.5 TABLET ORAL at 08:11

## 2020-09-27 RX ADMIN — SODIUM CHLORIDE 125 ML/HR: 9 INJECTION, SOLUTION INTRAVENOUS at 08:22

## 2020-09-27 RX ADMIN — SODIUM CHLORIDE, PRESERVATIVE FREE 10 ML: 5 INJECTION INTRAVENOUS at 08:11

## 2020-09-29 LAB
BACTERIA SPEC AEROBE CULT: NORMAL
BACTERIA SPEC AEROBE CULT: NORMAL
BACTERIA SPEC CULT: NORMAL
BACTERIA SPEC CULT: NORMAL
CAMPYLOBACTER STL CULT: NORMAL
E COLI SXT STL QL IA: NEGATIVE
SALM + SHIG STL CULT: NORMAL

## 2020-10-06 ENCOUNTER — HOSPITAL ENCOUNTER (OUTPATIENT)
Facility: HOSPITAL | Age: 65
Setting detail: HOSPITAL OUTPATIENT SURGERY
End: 2020-10-06
Attending: INTERNAL MEDICINE | Admitting: INTERNAL MEDICINE

## 2020-10-06 ENCOUNTER — OFFICE VISIT (OUTPATIENT)
Dept: GASTROENTEROLOGY | Facility: CLINIC | Age: 65
End: 2020-10-06

## 2020-10-06 VITALS
WEIGHT: 163 LBS | HEIGHT: 60 IN | DIASTOLIC BLOOD PRESSURE: 76 MMHG | BODY MASS INDEX: 32 KG/M2 | HEART RATE: 72 BPM | SYSTOLIC BLOOD PRESSURE: 129 MMHG

## 2020-10-06 DIAGNOSIS — D50.0 IRON DEFICIENCY ANEMIA DUE TO CHRONIC BLOOD LOSS: Primary | ICD-10-CM

## 2020-10-06 DIAGNOSIS — R19.7 DIARRHEA, UNSPECIFIED TYPE: ICD-10-CM

## 2020-10-06 DIAGNOSIS — K62.5 HEMORRHAGE OF ANUS AND RECTUM: ICD-10-CM

## 2020-10-06 DIAGNOSIS — R10.84 GENERALIZED ABDOMINAL PAIN: ICD-10-CM

## 2020-10-06 PROCEDURE — 99213 OFFICE O/P EST LOW 20 MIN: CPT | Performed by: INTERNAL MEDICINE

## 2020-10-06 RX ORDER — DEXTROSE AND SODIUM CHLORIDE 5; .45 G/100ML; G/100ML
30 INJECTION, SOLUTION INTRAVENOUS CONTINUOUS PRN
Status: CANCELLED | OUTPATIENT
Start: 2020-10-06

## 2020-10-06 NOTE — PATIENT INSTRUCTIONS

## 2020-10-06 NOTE — PROGRESS NOTES
Chief Complaint   Patient presents with   • Hospital Follow Up Visit       Subjective    Gabriela Celaya is a 65 y.o. female.    History of Present Illness  Patient presented to GI clinic for follow-up visit today.  She feels better currently.  No further abdominal pain or nausea, vomiting or rectal bleeding.  Completed antibiotic therapy for colitis.  Most recent hemoglobin was 11.3.       The following portions of the patient's history were reviewed and updated as appropriate:   Past Medical History:   Diagnosis Date   • Acute bronchitis    • Acute pharyngitis    • Allergic asthma     IgE-mediated allergic asthma      • Allergic rhinitis    • Asthmatic bronchitis    • Cough    • Diabetes mellitus screening    • Dizziness and giddiness    • Encounter for long-term current use of medication    • Essential hypertension    • Fatigue    • History of bone density study     DEXA (bone density) test, peripheral   • History of mammography, diagnostic    • Nuclear senile cataract    • Otalgia    • Shoulder pain     bursitis vs tendonitis      • Sprain of ankle, left    • Upper respiratory infection    • Vertigo    • Wheezing      Past Surgical History:   Procedure Laterality Date   • CARDIAC CATHETERIZATION  04/16/2008    Cardiac cath (negative)   • ENDOSCOPY N/A 3/9/2017    Procedure: ESOPHAGOGASTRODUODENOSCOPY possible dilation ;  Surgeon: Prince White MD;  Location: [facility] ENDOSCOPY;  Service:    • INJECTION OF MEDICATION  03/05/2013   • PAP SMEAR     • PELVIC FRACTURE SURGERY  1973    Anesth, repair fracture, pelvis   • TUBAL ABDOMINAL LIGATION       Family History   Problem Relation Age of Onset   • Asthma Mother    • Diabetes Mother    • Stroke Mother         cva in her 50's   • Cancer Father    • Diabetes Father    • Heart attack Father         mi in 50's   • No Known Problems Son      OB History    No obstetric history on file.       Prior to Admission medications    Medication Sig Start Date End Date  Taking? Authorizing Provider   aspirin 81 MG tablet Take 81 mg by mouth Daily. 2/23/16  Yes Edmundo Hercules MD   lisinopril (PRINIVIL,ZESTRIL) 2.5 MG tablet Take 1 tablet by mouth Daily. 9/26/17  Yes Kris Rendon MD   ondansetron ODT (Zofran ODT) 4 MG disintegrating tablet Place 1 tablet on the tongue Every 8 (Eight) Hours As Needed for Nausea or Vomiting. 9/27/20  Yes Sangeeta Cazares APRN   vitamin D (ERGOCALCIFEROL) 55993 UNITS capsule capsule Take 50,000 Units by mouth Every 30 (Thirty) Days. 4/12/17  Yes Edmundo Hercules MD     Allergies   Allergen Reactions   • Cefprozil Other (See Comments)     Made her feel funny in head      Social History     Socioeconomic History   • Marital status:      Spouse name: Not on file   • Number of children: Not on file   • Years of education: Not on file   • Highest education level: Not on file   Tobacco Use   • Smoking status: Never Smoker   • Smokeless tobacco: Never Used   Substance and Sexual Activity   • Alcohol use: No   • Drug use: No   • Sexual activity: Defer       Review of Systems  Review of Systems   Constitutional: Negative for chills, fatigue, fever and unexpected weight change.   HENT: Negative for congestion, ear discharge, hearing loss, nosebleeds and sore throat.    Eyes: Negative for pain, discharge and redness.   Respiratory: Negative for cough, chest tightness, shortness of breath and wheezing.    Cardiovascular: Negative for chest pain and palpitations.   Gastrointestinal: Negative for abdominal distention, abdominal pain, blood in stool, constipation, diarrhea, nausea and vomiting.   Endocrine: Negative for cold intolerance, polydipsia, polyphagia and polyuria.   Genitourinary: Negative for dysuria, flank pain, frequency, hematuria and urgency.   Musculoskeletal: Negative for arthralgias, back pain, joint swelling and myalgias.   Skin: Negative for color change, pallor and rash.   Neurological: Negative for tremors, seizures,  "syncope, weakness and headaches.   Hematological: Negative for adenopathy. Does not bruise/bleed easily.   Psychiatric/Behavioral: Negative for behavioral problems, confusion, dysphoric mood, hallucinations and suicidal ideas. The patient is not nervous/anxious.         /76 (BP Location: Left arm, Patient Position: Sitting)   Pulse 72   Ht 152.4 cm (60\")   Wt 73.9 kg (163 lb)   LMP  (LMP Unknown)   BMI 31.83 kg/m²     Objective    Physical Exam  Constitutional:       Appearance: She is well-developed.   HENT:      Head: Normocephalic and atraumatic.   Eyes:      Conjunctiva/sclera: Conjunctivae normal.      Pupils: Pupils are equal, round, and reactive to light.   Neck:      Musculoskeletal: Normal range of motion and neck supple.      Thyroid: No thyromegaly.   Cardiovascular:      Rate and Rhythm: Normal rate and regular rhythm.      Heart sounds: Normal heart sounds. No murmur.   Pulmonary:      Effort: Pulmonary effort is normal.      Breath sounds: Normal breath sounds. No wheezing.   Abdominal:      General: Bowel sounds are normal. There is no distension.      Palpations: Abdomen is soft. There is no mass.      Tenderness: There is no abdominal tenderness.      Hernia: No hernia is present.   Genitourinary:     Comments: No lesions noted  Musculoskeletal: Normal range of motion.         General: No tenderness.   Lymphadenopathy:      Cervical: No cervical adenopathy.   Skin:     General: Skin is warm and dry.      Findings: No rash.   Neurological:      Mental Status: She is alert and oriented to person, place, and time.      Cranial Nerves: No cranial nerve deficit.   Psychiatric:         Thought Content: Thought content normal.       Admission on 09/24/2020, Discharged on 09/27/2020   Component Date Value Ref Range Status   • Glucose 09/24/2020 117* 65 - 99 mg/dL Final   • BUN 09/24/2020 15  8 - 23 mg/dL Final   • Creatinine 09/24/2020 0.76  0.57 - 1.00 mg/dL Final   • Sodium 09/24/2020 138  136 " - 145 mmol/L Final   • Potassium 09/24/2020 3.7  3.5 - 5.2 mmol/L Final   • Chloride 09/24/2020 99  98 - 107 mmol/L Final   • CO2 09/24/2020 27.0  22.0 - 29.0 mmol/L Final   • Calcium 09/24/2020 9.7  8.6 - 10.5 mg/dL Final   • Total Protein 09/24/2020 7.4  6.0 - 8.5 g/dL Final   • Albumin 09/24/2020 4.30  3.50 - 5.20 g/dL Final   • ALT (SGPT) 09/24/2020 20  1 - 33 U/L Final   • AST (SGOT) 09/24/2020 27  1 - 32 U/L Final   • Alkaline Phosphatase 09/24/2020 94  39 - 117 U/L Final   • Total Bilirubin 09/24/2020 0.8  0.0 - 1.2 mg/dL Final   • eGFR Non African Amer 09/24/2020 77  >60 mL/min/1.73 Final   • Globulin 09/24/2020 3.1  gm/dL Final   • A/G Ratio 09/24/2020 1.4  g/dL Final   • BUN/Creatinine Ratio 09/24/2020 19.7  7.0 - 25.0 Final   • Anion Gap 09/24/2020 12.0  5.0 - 15.0 mmol/L Final   • Lipase 09/24/2020 15  13 - 60 U/L Final   • Color, UA 09/24/2020 Dark Yellow  Yellow, Straw, Dark Yellow, Valarie Final   • Appearance, UA 09/24/2020 Cloudy* Clear Final   • pH, UA 09/24/2020 5.5  5.0 - 9.0 Final   • Specific Gravity, UA 09/24/2020 1.028  1.003 - 1.030 Final   • Glucose, UA 09/24/2020 Negative  Negative Final   • Ketones, UA 09/24/2020 80 mg/dL (3+)* Negative Final   • Bilirubin, UA 09/24/2020 Moderate (2+)* Negative Final   • Blood, UA 09/24/2020 Large (3+)* Negative Final   • Protein, UA 09/24/2020 Trace* Negative Final   • Leuk Esterase, UA 09/24/2020 Small (1+)* Negative Final   • Nitrite, UA 09/24/2020 Negative  Negative Final   • Urobilinogen, UA 09/24/2020 1.0 E.U./dL  0.2 - 1.0 E.U./dL Final   • Extra Tube 09/24/2020 hold for add-on   Final    Auto resulted   • Extra Tube 09/24/2020 Hold for add-ons.   Final    Auto resulted.   • Extra Tube 09/24/2020 hold for add-on   Final    Auto resulted   • Extra Tube 09/24/2020 Hold for add-ons.   Final    Auto resulted.   • WBC 09/24/2020 18.97* 3.40 - 10.80 10*3/mm3 Final   • RBC 09/24/2020 4.38  3.77 - 5.28 10*6/mm3 Final   • Hemoglobin 09/24/2020 13.9  12.0 -  15.9 g/dL Final   • Hematocrit 09/24/2020 41.4  34.0 - 46.6 % Final   • MCV 09/24/2020 94.5  79.0 - 97.0 fL Final   • MCH 09/24/2020 31.7  26.6 - 33.0 pg Final   • MCHC 09/24/2020 33.6  31.5 - 35.7 g/dL Final   • RDW 09/24/2020 12.2* 12.3 - 15.4 % Final   • RDW-SD 09/24/2020 42.5  37.0 - 54.0 fl Final   • MPV 09/24/2020 9.1  6.0 - 12.0 fL Final   • Platelets 09/24/2020 282  140 - 450 10*3/mm3 Final   • Neutrophil % 09/24/2020 84.4* 42.7 - 76.0 % Final   • Lymphocyte % 09/24/2020 8.2* 19.6 - 45.3 % Final   • Monocyte % 09/24/2020 6.2  5.0 - 12.0 % Final   • Eosinophil % 09/24/2020 0.4  0.3 - 6.2 % Final   • Basophil % 09/24/2020 0.3  0.0 - 1.5 % Final   • Immature Grans % 09/24/2020 0.5  0.0 - 0.5 % Final   • Neutrophils, Absolute 09/24/2020 16.03* 1.70 - 7.00 10*3/mm3 Final   • Lymphocytes, Absolute 09/24/2020 1.55  0.70 - 3.10 10*3/mm3 Final   • Monocytes, Absolute 09/24/2020 1.17* 0.10 - 0.90 10*3/mm3 Final   • Eosinophils, Absolute 09/24/2020 0.08  0.00 - 0.40 10*3/mm3 Final   • Basophils, Absolute 09/24/2020 0.05  0.00 - 0.20 10*3/mm3 Final   • Immature Grans, Absolute 09/24/2020 0.09* 0.00 - 0.05 10*3/mm3 Final   • nRBC 09/24/2020 0.0  0.0 - 0.2 /100 WBC Final   • RBC, UA 09/24/2020 13-20* None Seen /HPF Final   • WBC, UA 09/24/2020 6-12* None Seen, 0-2, 3-5 /HPF Final   • Bacteria, UA 09/24/2020 None Seen  None Seen /HPF Final   • Squamous Epithelial Cells, UA 09/24/2020 6-12* None Seen, 0-2 /HPF Final   • Hyaline Casts, UA 09/24/2020 3-6  None Seen /LPF Final   • Methodology 09/24/2020 Automated Microscopy   Final   • PTT 09/24/2020 27.5  20.0 - 40.3 seconds Final   • Protime 09/24/2020 12.7  11.1 - 15.3 Seconds Final   • INR 09/24/2020 0.92  0.80 - 1.20 Final   • Campylobacter 09/24/2020 Not Detected  Not Detected Final   • Plesiomonas shigelloides 09/24/2020 Not Detected  Not Detected Final   • Salmonella 09/24/2020 Not Detected  Not Detected Final   • Vibrio 09/24/2020 Not Detected  Not Detected Final   •  Vibrio cholerae 09/24/2020 Not Detected  Not Detected Final   • Yersinia enterocolitica 09/24/2020 Not Detected  Not Detected Final   • Enteroaggregative E. coli (EAEC) 09/24/2020 Not Detected  Not Detected Final   • Enteropathogenic E. coli (EPEC) 09/24/2020 Not Detected  Not Detected Final   • Enterotoxigenic E. coli (ETEC) lt/* 09/24/2020 Not Detected  Not Detected Final   • Shiga-like toxin-producing E. coli* 09/24/2020 Not Detected  Not Detected Final   • E. coli O157 09/24/2020 Not Detected  Not Detected Final   • Shigella/Enteroinvasive E. coli (E* 09/24/2020 Not Detected  Not Detected Final   • Cryptosporidium 09/24/2020 Not Detected  Not Detected Final   • Cyclospora cayetanensis 09/24/2020 Not Detected  Not Detected Final   • Entamoeba histolytica 09/24/2020 Not Detected  Not Detected Final   • Giardia lamblia 09/24/2020 Not Detected  Not Detected Final   • Adenovirus F40/41 09/24/2020 Not Detected  Not Detected Final   • Astrovirus 09/24/2020 Not Detected  Not Detected Final   • Norovirus GI/GII 09/24/2020 Not Detected  Not Detected Final   • Rotavirus A 09/24/2020 Not Detected  Not Detected Final   • Sapovirus (I, II, IV or V) 09/24/2020 Not Detected  Not Detected Final   • COVID19 09/24/2020 Not Detected  Not Detected - Ref. Range Final   • Salmonella/Shigella Screen 09/24/2020 Final report   Final   • Result 1 09/24/2020 Comment   Final    No Salmonella or Shigella recovered.   • Campylobacter Culture 09/24/2020 Final report   Final   • Result 1 09/24/2020 Comment   Final    No Campylobacter species isolated.   • E coli, Shiga toxin Assay 09/24/2020 Negative  Negative Final   • Lactoferrin, Qual 09/24/2020 Positive* Negative Final   • Fecal Occult Blood 09/24/2020 Positive* Negative Final   • Blood Culture 09/24/2020 No growth at 5 days   Final   • Blood Culture 09/24/2020 No growth at 5 days   Final   • C. Difficile Toxins by PCR 09/24/2020 Negative  Negative Final   • Glucose 09/25/2020 76  65 - 99  mg/dL Final   • BUN 09/25/2020 10  8 - 23 mg/dL Final   • Creatinine 09/25/2020 0.58  0.57 - 1.00 mg/dL Final   • Sodium 09/25/2020 140  136 - 145 mmol/L Final   • Potassium 09/25/2020 3.6  3.5 - 5.2 mmol/L Final   • Chloride 09/25/2020 107  98 - 107 mmol/L Final   • CO2 09/25/2020 23.0  22.0 - 29.0 mmol/L Final   • Calcium 09/25/2020 8.2* 8.6 - 10.5 mg/dL Final   • eGFR Non African Amer 09/25/2020 105  >60 mL/min/1.73 Final   • BUN/Creatinine Ratio 09/25/2020 17.2  7.0 - 25.0 Final   • Anion Gap 09/25/2020 10.0  5.0 - 15.0 mmol/L Final   • WBC 09/25/2020 13.00* 3.40 - 10.80 10*3/mm3 Final   • RBC 09/25/2020 3.58* 3.77 - 5.28 10*6/mm3 Final   • Hemoglobin 09/25/2020 11.3* 12.0 - 15.9 g/dL Final    SPECIMEN REANALYZED TO CONFIRM HGB RESULTS   • Hematocrit 09/25/2020 33.8* 34.0 - 46.6 % Final   • MCV 09/25/2020 94.4  79.0 - 97.0 fL Final   • MCH 09/25/2020 31.6  26.6 - 33.0 pg Final   • MCHC 09/25/2020 33.4  31.5 - 35.7 g/dL Final   • RDW 09/25/2020 12.1* 12.3 - 15.4 % Final   • RDW-SD 09/25/2020 42.4  37.0 - 54.0 fl Final   • MPV 09/25/2020 9.3  6.0 - 12.0 fL Final   • Platelets 09/25/2020 228  140 - 450 10*3/mm3 Final   • Neutrophil % 09/25/2020 78.9* 42.7 - 76.0 % Final   • Lymphocyte % 09/25/2020 11.0* 19.6 - 45.3 % Final   • Monocyte % 09/25/2020 7.1  5.0 - 12.0 % Final   • Eosinophil % 09/25/2020 2.1  0.3 - 6.2 % Final   • Basophil % 09/25/2020 0.3  0.0 - 1.5 % Final   • Immature Grans % 09/25/2020 0.6* 0.0 - 0.5 % Final   • Neutrophils, Absolute 09/25/2020 10.26* 1.70 - 7.00 10*3/mm3 Final   • Lymphocytes, Absolute 09/25/2020 1.43  0.70 - 3.10 10*3/mm3 Final   • Monocytes, Absolute 09/25/2020 0.92* 0.10 - 0.90 10*3/mm3 Final   • Eosinophils, Absolute 09/25/2020 0.27  0.00 - 0.40 10*3/mm3 Final   • Basophils, Absolute 09/25/2020 0.04  0.00 - 0.20 10*3/mm3 Final   • Immature Grans, Absolute 09/25/2020 0.08* 0.00 - 0.05 10*3/mm3 Final   • nRBC 09/25/2020 0.0  0.0 - 0.2 /100 WBC Final   • WBC 09/26/2020 8.39  3.40  - 10.80 10*3/mm3 Final   • RBC 09/26/2020 3.48* 3.77 - 5.28 10*6/mm3 Final   • Hemoglobin 09/26/2020 11.0* 12.0 - 15.9 g/dL Final   • Hematocrit 09/26/2020 32.1* 34.0 - 46.6 % Final   • MCV 09/26/2020 92.2  79.0 - 97.0 fL Final   • MCH 09/26/2020 31.6  26.6 - 33.0 pg Final   • MCHC 09/26/2020 34.3  31.5 - 35.7 g/dL Final   • RDW 09/26/2020 11.9* 12.3 - 15.4 % Final   • RDW-SD 09/26/2020 40.3  37.0 - 54.0 fl Final   • MPV 09/26/2020 9.1  6.0 - 12.0 fL Final   • Platelets 09/26/2020 261  140 - 450 10*3/mm3 Final   • Glucose 09/26/2020 89  65 - 99 mg/dL Final   • BUN 09/26/2020 5* 8 - 23 mg/dL Final   • Creatinine 09/26/2020 0.55* 0.57 - 1.00 mg/dL Final   • Sodium 09/26/2020 138  136 - 145 mmol/L Final   • Potassium 09/26/2020 3.2* 3.5 - 5.2 mmol/L Final   • Chloride 09/26/2020 106  98 - 107 mmol/L Final   • CO2 09/26/2020 22.0  22.0 - 29.0 mmol/L Final   • Calcium 09/26/2020 8.7  8.6 - 10.5 mg/dL Final   • eGFR Non African Amer 09/26/2020 112  >60 mL/min/1.73 Final   • BUN/Creatinine Ratio 09/26/2020 9.1  7.0 - 25.0 Final   • Anion Gap 09/26/2020 10.0  5.0 - 15.0 mmol/L Final   • WBC 09/27/2020 6.46  3.40 - 10.80 10*3/mm3 Final   • RBC 09/27/2020 3.31* 3.77 - 5.28 10*6/mm3 Final   • Hemoglobin 09/27/2020 10.5* 12.0 - 15.9 g/dL Final   • Hematocrit 09/27/2020 30.5* 34.0 - 46.6 % Final   • MCV 09/27/2020 92.1  79.0 - 97.0 fL Final   • MCH 09/27/2020 31.7  26.6 - 33.0 pg Final   • MCHC 09/27/2020 34.4  31.5 - 35.7 g/dL Final   • RDW 09/27/2020 12.0* 12.3 - 15.4 % Final   • RDW-SD 09/27/2020 40.8  37.0 - 54.0 fl Final   • MPV 09/27/2020 9.1  6.0 - 12.0 fL Final   • Platelets 09/27/2020 270  140 - 450 10*3/mm3 Final   • Glucose 09/27/2020 90  65 - 99 mg/dL Final   • BUN 09/27/2020 4* 8 - 23 mg/dL Final   • Creatinine 09/27/2020 0.54* 0.57 - 1.00 mg/dL Final   • Sodium 09/27/2020 141  136 - 145 mmol/L Final   • Potassium 09/27/2020 3.6  3.5 - 5.2 mmol/L Final   • Chloride 09/27/2020 109* 98 - 107 mmol/L Final   • CO2  09/27/2020 23.0  22.0 - 29.0 mmol/L Final   • Calcium 09/27/2020 8.8  8.6 - 10.5 mg/dL Final   • eGFR Non African Amer 09/27/2020 114  >60 mL/min/1.73 Final   • BUN/Creatinine Ratio 09/27/2020 7.4  7.0 - 25.0 Final   • Anion Gap 09/27/2020 9.0  5.0 - 15.0 mmol/L Final     Assessment/Plan      1. Iron deficiency anemia due to chronic blood loss    2. Generalized abdominal pain    3. Diarrhea, unspecified type    4. Hemorrhage of anus and rectum    1.  Abdominal pain with diarrhea and rectal bleeding, resolved.  Likely due to colitis.  Schedule colonoscopy for further evaluation.  2.  Iron deficiency anemia, rule out upper GI blood loss.  Schedule EGD.  Continue PPI.  3.  Obesity, recommend exercise and diet control.      Orders placed during this encounter include:  Orders Placed This Encounter   Procedures   • Follow Anesthesia Guidelines / Standing Orders     Standing Status:   Future   • Obtain Informed Consent     Standing Status:   Future     Order Specific Question:   Informed Consent Given For     Answer:   egd and colonoscopy       ESOPHAGOGASTRODUODENOSCOPY (N/A), COLONOSCOPY (N/A)    Review and/or summary of lab tests, radiology, procedures, medications. Review and summary of old records and obtaining of history. The risks and benefits of my recommendations, as well as other treatment options were discussed with the patient today. Questions were answered.    No orders of the defined types were placed in this encounter.      Follow-up: No follow-ups on file.               Results for orders placed or performed during the hospital encounter of 09/24/20   Gastrointestinal Panel, PCR - Stool, Per Rectum    Specimen: Per Rectum; Stool   Result Value Ref Range    Campylobacter Not Detected Not Detected    Plesiomonas shigelloides Not Detected Not Detected    Salmonella Not Detected Not Detected    Vibrio Not Detected Not Detected    Vibrio cholerae Not Detected Not Detected    Yersinia enterocolitica Not Detected  Not Detected    Enteroaggregative E. coli (EAEC) Not Detected Not Detected    Enteropathogenic E. coli (EPEC) Not Detected Not Detected    Enterotoxigenic E. coli (ETEC) lt/st Not Detected Not Detected    Shiga-like toxin-producing E. coli (STEC) stx1/stx2 Not Detected Not Detected    E. coli O157 Not Detected Not Detected    Shigella/Enteroinvasive E. coli (EIEC) Not Detected Not Detected    Cryptosporidium Not Detected Not Detected    Cyclospora cayetanensis Not Detected Not Detected    Entamoeba histolytica Not Detected Not Detected    Giardia lamblia Not Detected Not Detected    Adenovirus F40/41 Not Detected Not Detected    Astrovirus Not Detected Not Detected    Norovirus GI/GII Not Detected Not Detected    Rotavirus A Not Detected Not Detected    Sapovirus (I, II, IV or V) Not Detected Not Detected   Gold Top - SST   Result Value Ref Range    Extra Tube Hold for add-ons.    Green Top (Gel)   Result Value Ref Range    Extra Tube Hold for add-ons.    COVID-19, [facility] IN-HOUSE, NP SWAB IN TRANSPORT MEDIA 8-10 HR TAT - Swab, Nasopharynx    Specimen: Nasopharynx; Swab   Result Value Ref Range    COVID19 Not Detected Not Detected - Ref. Range   Urinalysis, Microscopic Only - Urine, Clean Catch    Specimen: Urine, Clean Catch   Result Value Ref Range    RBC, UA 13-20 (A) None Seen /HPF    WBC, UA 6-12 (A) None Seen, 0-2, 3-5 /HPF    Bacteria, UA None Seen None Seen /HPF    Squamous Epithelial Cells, UA 6-12 (A) None Seen, 0-2 /HPF    Hyaline Casts, UA 3-6 None Seen /LPF    Methodology Automated Microscopy    Urinalysis With Microscopic If Indicated (No Culture) - Urine, Clean Catch    Specimen: Urine, Clean Catch   Result Value Ref Range    Color, UA Dark Yellow Yellow, Straw, Dark Yellow, Valarie    Appearance, UA Cloudy (A) Clear    pH, UA 5.5 5.0 - 9.0    Specific Gravity, UA 1.028 1.003 - 1.030    Glucose, UA Negative Negative    Ketones, UA 80 mg/dL (3+) (A) Negative    Bilirubin, UA Moderate (2+) (A) Negative     Blood, UA Large (3+) (A) Negative    Protein, UA Trace (A) Negative    Leuk Esterase, UA Small (1+) (A) Negative    Nitrite, UA Negative Negative    Urobilinogen, UA 1.0 E.U./dL 0.2 - 1.0 E.U./dL   CBC Auto Differential    Specimen: Blood   Result Value Ref Range    WBC 13.00 (H) 3.40 - 10.80 10*3/mm3    RBC 3.58 (L) 3.77 - 5.28 10*6/mm3    Hemoglobin 11.3 (L) 12.0 - 15.9 g/dL    Hematocrit 33.8 (L) 34.0 - 46.6 %    MCV 94.4 79.0 - 97.0 fL    MCH 31.6 26.6 - 33.0 pg    MCHC 33.4 31.5 - 35.7 g/dL    RDW 12.1 (L) 12.3 - 15.4 %    RDW-SD 42.4 37.0 - 54.0 fl    MPV 9.3 6.0 - 12.0 fL    Platelets 228 140 - 450 10*3/mm3    Neutrophil % 78.9 (H) 42.7 - 76.0 %    Lymphocyte % 11.0 (L) 19.6 - 45.3 %    Monocyte % 7.1 5.0 - 12.0 %    Eosinophil % 2.1 0.3 - 6.2 %    Basophil % 0.3 0.0 - 1.5 %    Immature Grans % 0.6 (H) 0.0 - 0.5 %    Neutrophils, Absolute 10.26 (H) 1.70 - 7.00 10*3/mm3    Lymphocytes, Absolute 1.43 0.70 - 3.10 10*3/mm3    Monocytes, Absolute 0.92 (H) 0.10 - 0.90 10*3/mm3    Eosinophils, Absolute 0.27 0.00 - 0.40 10*3/mm3    Basophils, Absolute 0.04 0.00 - 0.20 10*3/mm3    Immature Grans, Absolute 0.08 (H) 0.00 - 0.05 10*3/mm3    nRBC 0.0 0.0 - 0.2 /100 WBC   CBC Auto Differential    Specimen: Blood   Result Value Ref Range    WBC 18.97 (H) 3.40 - 10.80 10*3/mm3    RBC 4.38 3.77 - 5.28 10*6/mm3    Hemoglobin 13.9 12.0 - 15.9 g/dL    Hematocrit 41.4 34.0 - 46.6 %    MCV 94.5 79.0 - 97.0 fL    MCH 31.7 26.6 - 33.0 pg    MCHC 33.6 31.5 - 35.7 g/dL    RDW 12.2 (L) 12.3 - 15.4 %    RDW-SD 42.5 37.0 - 54.0 fl    MPV 9.1 6.0 - 12.0 fL    Platelets 282 140 - 450 10*3/mm3    Neutrophil % 84.4 (H) 42.7 - 76.0 %    Lymphocyte % 8.2 (L) 19.6 - 45.3 %    Monocyte % 6.2 5.0 - 12.0 %    Eosinophil % 0.4 0.3 - 6.2 %    Basophil % 0.3 0.0 - 1.5 %    Immature Grans % 0.5 0.0 - 0.5 %    Neutrophils, Absolute 16.03 (H) 1.70 - 7.00 10*3/mm3    Lymphocytes, Absolute 1.55 0.70 - 3.10 10*3/mm3    Monocytes, Absolute 1.17 (H) 0.10 -  0.90 10*3/mm3    Eosinophils, Absolute 0.08 0.00 - 0.40 10*3/mm3    Basophils, Absolute 0.05 0.00 - 0.20 10*3/mm3    Immature Grans, Absolute 0.09 (H) 0.00 - 0.05 10*3/mm3    nRBC 0.0 0.0 - 0.2 /100 WBC   Lavender Top   Result Value Ref Range    Extra Tube hold for add-on    Light Blue Top   Result Value Ref Range    Extra Tube hold for add-on    Fecal Lactoferrin - Stool, Per Rectum    Specimen: Per Rectum; Stool   Result Value Ref Range    Lactoferrin, Qual Positive (A) Negative   Occult Blood X 1, Stool - Stool, Per Rectum    Specimen: Per Rectum; Stool   Result Value Ref Range    Fecal Occult Blood Positive (A) Negative   Blood Culture - Blood, Arm, Left    Specimen: Arm, Left; Blood   Result Value Ref Range    Blood Culture No growth at 5 days    Blood Culture - Blood, Hand, Right    Specimen: Hand, Right; Blood   Result Value Ref Range    Blood Culture No growth at 5 days    aPTT    Specimen: Blood   Result Value Ref Range    PTT 27.5 20.0 - 40.3 seconds   Protime-INR    Specimen: Blood   Result Value Ref Range    Protime 12.7 11.1 - 15.3 Seconds    INR 0.92 0.80 - 1.20   CBC (No Diff)    Specimen: Blood   Result Value Ref Range    WBC 6.46 3.40 - 10.80 10*3/mm3    RBC 3.31 (L) 3.77 - 5.28 10*6/mm3    Hemoglobin 10.5 (L) 12.0 - 15.9 g/dL    Hematocrit 30.5 (L) 34.0 - 46.6 %    MCV 92.1 79.0 - 97.0 fL    MCH 31.7 26.6 - 33.0 pg    MCHC 34.4 31.5 - 35.7 g/dL    RDW 12.0 (L) 12.3 - 15.4 %    RDW-SD 40.8 37.0 - 54.0 fl    MPV 9.1 6.0 - 12.0 fL    Platelets 270 140 - 450 10*3/mm3     *Note: Due to a large number of results and/or encounters for the requested time period, some results have not been displayed. A complete set of results can be found in Results Review.         This document has been electronically signed by Ramona Conway MD on October 6, 2020 16:32 CDT

## 2020-10-23 RX ORDER — SODIUM, POTASSIUM,MAG SULFATES 17.5-3.13G
2 SOLUTION, RECONSTITUTED, ORAL ORAL EVERY 12 HOURS
Qty: 2 BOTTLE | Refills: 0 | Status: SHIPPED | OUTPATIENT
Start: 2020-10-23

## 2020-10-26 ENCOUNTER — TELEPHONE (OUTPATIENT)
Dept: GASTROENTEROLOGY | Facility: CLINIC | Age: 65
End: 2020-10-26

## 2020-10-26 NOTE — TELEPHONE ENCOUNTER
Called and spoke with pt to apologize about the miscommunication error on my part regarding her bowel prep. The Suprep was sent in to [facility] instead of our local [facility].   Palliative care encounter

## 2020-12-14 ENCOUNTER — LAB (OUTPATIENT)
Dept: LAB | Facility: HOSPITAL | Age: 65
End: 2020-12-14

## 2020-12-14 DIAGNOSIS — Z01.818 PREOP TESTING: Primary | ICD-10-CM

## 2020-12-14 PROCEDURE — U0003 INFECTIOUS AGENT DETECTION BY NUCLEIC ACID (DNA OR RNA); SEVERE ACUTE RESPIRATORY SYNDROME CORONAVIRUS 2 (SARS-COV-2) (CORONAVIRUS DISEASE [COVID-19]), AMPLIFIED PROBE TECHNIQUE, MAKING USE OF HIGH THROUGHPUT TECHNOLOGIES AS DESCRIBED BY CMS-2020-01-R: HCPCS

## 2020-12-14 PROCEDURE — C9803 HOPD COVID-19 SPEC COLLECT: HCPCS

## 2020-12-15 LAB
COVID LABCORP PRIORITY: NORMAL
SARS-COV-2 RNA RESP QL NAA+PROBE: NOT DETECTED

## 2020-12-17 ENCOUNTER — ANESTHESIA (OUTPATIENT)
Dept: GASTROENTEROLOGY | Facility: HOSPITAL | Age: 65
End: 2020-12-17

## 2020-12-17 ENCOUNTER — HOSPITAL ENCOUNTER (OUTPATIENT)
Facility: HOSPITAL | Age: 65
Setting detail: HOSPITAL OUTPATIENT SURGERY
Discharge: HOME | End: 2020-12-17
Attending: INTERNAL MEDICINE | Admitting: INTERNAL MEDICINE

## 2020-12-17 ENCOUNTER — ANESTHESIA EVENT (OUTPATIENT)
Dept: GASTROENTEROLOGY | Facility: HOSPITAL | Age: 65
End: 2020-12-17

## 2020-12-17 VITALS
DIASTOLIC BLOOD PRESSURE: 72 MMHG | RESPIRATION RATE: 18 BRPM | SYSTOLIC BLOOD PRESSURE: 125 MMHG | OXYGEN SATURATION: 99 % | WEIGHT: 160 LBS | BODY MASS INDEX: 31.25 KG/M2 | TEMPERATURE: 97.2 F | HEART RATE: 83 BPM

## 2020-12-17 DIAGNOSIS — K55.9 ISCHEMIC BOWEL DISEASE (HCC): ICD-10-CM

## 2020-12-17 PROCEDURE — 25010000002 PROPOFOL 10 MG/ML EMULSION: Performed by: NURSE ANESTHETIST, CERTIFIED REGISTERED

## 2020-12-17 PROCEDURE — 88305 TISSUE EXAM BY PATHOLOGIST: CPT

## 2020-12-17 RX ORDER — LIDOCAINE HYDROCHLORIDE 20 MG/ML
INJECTION, SOLUTION EPIDURAL; INFILTRATION; INTRACAUDAL; PERINEURAL AS NEEDED
Status: DISCONTINUED | OUTPATIENT
Start: 2020-12-17 | End: 2020-12-17 | Stop reason: SURG

## 2020-12-17 RX ORDER — DEXTROSE AND SODIUM CHLORIDE 5; .45 G/100ML; G/100ML
30 INJECTION, SOLUTION INTRAVENOUS CONTINUOUS PRN
Status: DISCONTINUED | OUTPATIENT
Start: 2020-12-17 | End: 2020-12-17 | Stop reason: HOSPADM

## 2020-12-17 RX ORDER — PROPOFOL 10 MG/ML
VIAL (ML) INTRAVENOUS AS NEEDED
Status: DISCONTINUED | OUTPATIENT
Start: 2020-12-17 | End: 2020-12-17 | Stop reason: SURG

## 2020-12-17 RX ADMIN — LIDOCAINE HYDROCHLORIDE 60 MG: 20 INJECTION, SOLUTION EPIDURAL; INFILTRATION; INTRACAUDAL; PERINEURAL at 09:40

## 2020-12-17 RX ADMIN — DEXTROSE AND SODIUM CHLORIDE 30 ML/HR: 5; 450 INJECTION, SOLUTION INTRAVENOUS at 09:24

## 2020-12-17 RX ADMIN — PROPOFOL 30 MG: 10 INJECTION, EMULSION INTRAVENOUS at 09:43

## 2020-12-17 RX ADMIN — PROPOFOL 30 MG: 10 INJECTION, EMULSION INTRAVENOUS at 09:51

## 2020-12-17 RX ADMIN — PROPOFOL 70 MG: 10 INJECTION, EMULSION INTRAVENOUS at 09:40

## 2020-12-17 RX ADMIN — PROPOFOL 30 MG: 10 INJECTION, EMULSION INTRAVENOUS at 09:46

## 2020-12-17 NOTE — H&P
Grisel Christensen DO,[facility]  Gastroenterology  Hepatology  Endoscopy  Board Certified in Internal Medicine and gastroenterology  [address], [address]  [place]. [address]   [phone]   F - [phone]     GASTROENTEROLOGY HISTORY AND PHYSICAL  NOTE   GRISEL CHRISTENSEN DO.         SUBJECTIVE:   12/17/2020    Name: Gabriela Celaya  DOD: 1955        Chief Complaint:     Subjective : Gastrointestinal bleeding, abnormal CT scan.  Suspected mucosal ischemic colitis.  Evaluate for any stenosis or stricturing that has occurred after healing and or neoplasm or inflammatory bowel disease  Patient is 65 y.o. female presents with desire for elective colonoscopy with possible biopsy.      ROS/HISTORY/ CURRENT MEDICATIONS/OBJECTIVE/VS/PE:   Review of Systems:  All systems unremarkable unless specified below.  Constitutional   HENT  Eyes   Respiratory    Cardiovascular  Gastrointestinal   Endocrine  Genitourinary    Musculoskeletal   Skin  Allergic/Immunologic    Neurological    Hematological  Psychiatric/Behavioral    History:     Past Medical History:   Diagnosis Date   • Acute bronchitis    • Acute pharyngitis    • Allergic rhinitis    • Cough    • Diabetes mellitus screening    • Dizziness and giddiness    • Encounter for long-term current use of medication    • Essential hypertension    • Fatigue    • History of bone density study     DEXA (bone density) test, peripheral   • History of mammography, diagnostic    • Nuclear senile cataract    • Otalgia    • Shoulder pain     bursitis vs tendonitis      • Sprain of ankle, left    • Upper respiratory infection    • Vertigo    • Wheezing      Past Surgical History:   Procedure Laterality Date   • CARDIAC CATHETERIZATION  04/16/2008    Cardiac cath (negative)   • ENDOSCOPY N/A 3/9/2017    Procedure: ESOPHAGOGASTRODUODENOSCOPY possible dilation ;  Surgeon: Prince White MD;  Location: [facility] ENDOSCOPY;  Service:    • INJECTION OF MEDICATION   03/05/2013   • PAP SMEAR     • PELVIC FRACTURE SURGERY  1973    Anesth, repair fracture, pelvis   • TUBAL ABDOMINAL LIGATION       Family History   Problem Relation Age of Onset   • Asthma Mother    • Diabetes Mother    • Stroke Mother         cva in her 50's   • Cancer Father    • Diabetes Father    • Heart attack Father         mi in 50's   • No Known Problems Son      Social History     Tobacco Use   • Smoking status: Never Smoker   • Smokeless tobacco: Never Used   Substance Use Topics   • Alcohol use: No   • Drug use: No     Prior to Admission medications    Medication Sig Start Date End Date Taking? Authorizing Provider   lisinopril (PRINIVIL,ZESTRIL) 2.5 MG tablet Take 1 tablet by mouth Daily. 9/26/17  Yes Kris Rendon MD   vitamin D (ERGOCALCIFEROL) 03912 UNITS capsule capsule Take 50,000 Units by mouth Every 30 (Thirty) Days. 4/12/17  Yes Edmundo Hercules MD   aspirin 81 MG tablet Take 81 mg by mouth Daily. 2/23/16   Edmundo Hercules MD   sodium-potassium-magnesium sulfates (Suprep Bowel Prep Kit) 17.5-3.13-1.6 GM/177ML solution oral solution Take 2 bottles by mouth Every 12 (Twelve) Hours. 10/23/20   Ramona Conway MD     Allergies:  Cefprozil    I have reviewed the patients medical history, surgical history and family history in the available medical record system.     Current Medications:     Current Facility-Administered Medications   Medication Dose Route Frequency Provider Last Rate Last Admin   • dextrose 5 % and sodium chloride 0.45 % infusion  30 mL/hr Intravenous Continuous PRN Harsh Duggan DO 30 mL/hr at 12/17/20 0924 30 mL/hr at 12/17/20 0924       Objective     Physical Exam:   Temp:  [97 °F (36.1 °C)] 97 °F (36.1 °C)  Heart Rate:  [75] 75  Resp:  [18] 18  BP: (172)/(85) 172/85    Physical Exam:  General Appearance:    Alert, cooperative, in no acute distress   Head:    Normocephalic, without obvious abnormality, atraumatic   Eyes:            Lids and lashes normal,  conjunctivae and sclerae normal, no   icterus, no pallor, corneas clear, PERRLA   Ears:    Ears appear intact with no abnormalities noted   Throat:   No oral lesions, no thrush, oral mucosa moist   Neck:   No adenopathy, supple, trachea midline, no thyromegaly, no     carotid bruit, no JVD   Back:     No kyphosis present, no scoliosis present, no skin lesions,       erythema or scars, no tenderness to percussion or                   palpation,   range of motion normal   Lungs:     Clear to auscultation,respirations regular, even and                   unlabored    Heart:    Regular rhythm and normal rate, normal S1 and S2, no            murmur, no gallop, no rub, no click   Breast Exam:    Deferred   Abdomen:     Normal bowel sounds, no masses, no organomegaly, soft        non-tender, non-distended, no guarding, no rebound                 tenderness   Genitalia:    Deferred   Extremities:   Moves all extremities well, no edema, no cyanosis, no              redness   Pulses:   Pulses palpable and equal bilaterally   Skin:   No bleeding, bruising or rash   Lymph nodes:   No palpable adenopathy   Neurologic:   Cranial nerves 2 - 12 grossly intact, sensation intact, DTR        present and equal bilaterally      Results Review:     Lab Results   Component Value Date    WBC 6.46 09/27/2020    WBC 8.39 09/26/2020    WBC 13.00 (H) 09/25/2020    HGB 10.5 (L) 09/27/2020    HGB 11.0 (L) 09/26/2020    HGB 11.3 (L) 09/25/2020    HCT 30.5 (L) 09/27/2020    HCT 32.1 (L) 09/26/2020    HCT 33.8 (L) 09/25/2020     09/27/2020     09/26/2020     09/25/2020             No results found for: LIPASE  Lab Results   Component Value Date    INR 0.92 09/24/2020     No results found for: CULTURE    Radiology Review:  Imaging Results (Last 72 Hours)     ** No results found for the last 72 hours. **           I reviewed the patient's new clinical results.  I reviewed the patient's new imaging results and agree with the  interpretation.     ASSESSMENT/PLAN:   ASSESSMENT:  1.  Mucosal ischemic colitis, probably resolved.    PLAN:  1.  Colonoscopy with biopsy as required    Risk and benefits associated with the procedure are reviewed with the patient.  Patient wished to proceed     Harsh Duggan DO  12/17/20  09:28 CST

## 2020-12-17 NOTE — ANESTHESIA POSTPROCEDURE EVALUATION
Patient: Gabriela Celaya    Procedure Summary     Date: 12/17/20 Room / Location: [facility] ENDOSCOPY 2 / [facility] ENDOSCOPY    Anesthesia Start: 0937 Anesthesia Stop: 0955    Procedure: COLONOSCOPY (N/A ) Diagnosis:       Ischemic bowel disease (CMS/HCC)      (Ischemic bowel disease (CMS/HCC) [K55.9])    Surgeon: Harsh Duggan DO Provider: Shlomo Isabel CRNA    Anesthesia Type: MAC ASA Status: 3          Anesthesia Type: MAC    Vitals  No vitals data found for the desired time range.          Post Anesthesia Care and Evaluation    Patient location during evaluation: bedside  Patient participation: waiting for patient participation  Level of consciousness: responsive to physical stimuli  Pain management: adequate  Airway patency: patent  Anesthetic complications: No anesthetic complications  PONV Status: none  Cardiovascular status: acceptable  Respiratory status: acceptable  Hydration status: acceptable

## 2020-12-22 LAB
LAB AP CASE REPORT: NORMAL
PATH REPORT.FINAL DX SPEC: NORMAL

## 2023-03-09 ENCOUNTER — APPOINTMENT (OUTPATIENT)
Dept: GENERAL RADIOLOGY | Facility: HOSPITAL | Age: 68
End: 2023-03-09
Payer: MEDICARE

## 2023-03-09 ENCOUNTER — HOSPITAL ENCOUNTER (EMERGENCY)
Facility: HOSPITAL | Age: 68
Discharge: HOME | End: 2023-03-09
Attending: STUDENT IN AN ORGANIZED HEALTH CARE EDUCATION/TRAINING PROGRAM | Admitting: STUDENT IN AN ORGANIZED HEALTH CARE EDUCATION/TRAINING PROGRAM
Payer: MEDICARE

## 2023-03-09 VITALS
OXYGEN SATURATION: 98 % | HEIGHT: 60 IN | TEMPERATURE: 97.8 F | BODY MASS INDEX: 31.41 KG/M2 | RESPIRATION RATE: 16 BRPM | WEIGHT: 160 LBS | HEART RATE: 73 BPM | SYSTOLIC BLOOD PRESSURE: 125 MMHG | DIASTOLIC BLOOD PRESSURE: 67 MMHG

## 2023-03-09 DIAGNOSIS — M25.50 ARTHRALGIA, UNSPECIFIED JOINT: Primary | ICD-10-CM

## 2023-03-09 LAB
ALBUMIN SERPL-MCNC: 4 G/DL (ref 3.5–5.2)
ALBUMIN/GLOB SERPL: 1.3 G/DL
ALP SERPL-CCNC: 83 U/L (ref 39–117)
ALT SERPL W P-5'-P-CCNC: 35 U/L (ref 1–33)
ANION GAP SERPL CALCULATED.3IONS-SCNC: 10 MMOL/L (ref 5–15)
AST SERPL-CCNC: 32 U/L (ref 1–32)
BACTERIA UR QL AUTO: ABNORMAL /HPF
BASOPHILS # BLD AUTO: 0.03 10*3/MM3 (ref 0–0.2)
BASOPHILS NFR BLD AUTO: 0.2 % (ref 0–1.5)
BILIRUB SERPL-MCNC: 0.6 MG/DL (ref 0–1.2)
BILIRUB UR QL STRIP: NEGATIVE
BUN SERPL-MCNC: 13 MG/DL (ref 8–23)
BUN/CREAT SERPL: 21.7 (ref 7–25)
CALCIUM SPEC-SCNC: 9.9 MG/DL (ref 8.6–10.5)
CHLORIDE SERPL-SCNC: 105 MMOL/L (ref 98–107)
CLARITY UR: CLEAR
CO2 SERPL-SCNC: 23 MMOL/L (ref 22–29)
COLOR UR: YELLOW
CREAT SERPL-MCNC: 0.6 MG/DL (ref 0.57–1)
CRP SERPL-MCNC: 4.23 MG/DL (ref 0–0.5)
DEPRECATED RDW RBC AUTO: 41.6 FL (ref 37–54)
EGFRCR SERPLBLD CKD-EPI 2021: 99.1 ML/MIN/1.73
EOSINOPHIL # BLD AUTO: 0.03 10*3/MM3 (ref 0–0.4)
EOSINOPHIL NFR BLD AUTO: 0.2 % (ref 0.3–6.2)
ERYTHROCYTE [DISTWIDTH] IN BLOOD BY AUTOMATED COUNT: 12.7 % (ref 12.3–15.4)
ERYTHROCYTE [SEDIMENTATION RATE] IN BLOOD: 30 MM/HR (ref 0–30)
GLOBULIN UR ELPH-MCNC: 3.1 GM/DL
GLUCOSE SERPL-MCNC: 113 MG/DL (ref 65–99)
GLUCOSE UR STRIP-MCNC: NEGATIVE MG/DL
HCT VFR BLD AUTO: 35.4 % (ref 34–46.6)
HGB BLD-MCNC: 12.1 G/DL (ref 12–15.9)
HGB UR QL STRIP.AUTO: ABNORMAL
HYALINE CASTS UR QL AUTO: ABNORMAL /LPF
IMM GRANULOCYTES # BLD AUTO: 0.05 10*3/MM3 (ref 0–0.05)
IMM GRANULOCYTES NFR BLD AUTO: 0.4 % (ref 0–0.5)
KETONES UR QL STRIP: NEGATIVE
LEUKOCYTE ESTERASE UR QL STRIP.AUTO: NEGATIVE
LYMPHOCYTES # BLD AUTO: 0.9 10*3/MM3 (ref 0.7–3.1)
LYMPHOCYTES NFR BLD AUTO: 7 % (ref 19.6–45.3)
MCH RBC QN AUTO: 31.3 PG (ref 26.6–33)
MCHC RBC AUTO-ENTMCNC: 34.2 G/DL (ref 31.5–35.7)
MCV RBC AUTO: 91.7 FL (ref 79–97)
MONOCYTES # BLD AUTO: 0.78 10*3/MM3 (ref 0.1–0.9)
MONOCYTES NFR BLD AUTO: 6 % (ref 5–12)
NEUTROPHILS NFR BLD AUTO: 11.15 10*3/MM3 (ref 1.7–7)
NEUTROPHILS NFR BLD AUTO: 86.2 % (ref 42.7–76)
NITRITE UR QL STRIP: NEGATIVE
NRBC BLD AUTO-RTO: 0 /100 WBC (ref 0–0.2)
NT-PROBNP SERPL-MCNC: 90.3 PG/ML (ref 0–900)
PH UR STRIP.AUTO: 6 [PH] (ref 5–9)
PLATELET # BLD AUTO: 347 10*3/MM3 (ref 140–450)
PMV BLD AUTO: 9.3 FL (ref 6–12)
POTASSIUM SERPL-SCNC: 4 MMOL/L (ref 3.5–5.2)
PROT SERPL-MCNC: 7.1 G/DL (ref 6–8.5)
PROT UR QL STRIP: NEGATIVE
RBC # BLD AUTO: 3.86 10*6/MM3 (ref 3.77–5.28)
RBC # UR STRIP: ABNORMAL /HPF
REF LAB TEST METHOD: ABNORMAL
SODIUM SERPL-SCNC: 138 MMOL/L (ref 136–145)
SP GR UR STRIP: 1.01 (ref 1–1.03)
SQUAMOUS #/AREA URNS HPF: ABNORMAL /HPF
T4 FREE SERPL-MCNC: 1.18 NG/DL (ref 0.93–1.7)
TSH SERPL DL<=0.05 MIU/L-ACNC: 0.97 UIU/ML (ref 0.27–4.2)
URATE SERPL-MCNC: 4.3 MG/DL (ref 2.4–5.7)
UROBILINOGEN UR QL STRIP: ABNORMAL
WBC # UR STRIP: ABNORMAL /HPF
WBC NRBC COR # BLD: 12.94 10*3/MM3 (ref 3.4–10.8)
WHOLE BLOOD HOLD COAG: NORMAL

## 2023-03-09 PROCEDURE — 84443 ASSAY THYROID STIM HORMONE: CPT | Performed by: NURSE PRACTITIONER

## 2023-03-09 PROCEDURE — 85025 COMPLETE CBC W/AUTO DIFF WBC: CPT | Performed by: NURSE PRACTITIONER

## 2023-03-09 PROCEDURE — 81001 URINALYSIS AUTO W/SCOPE: CPT | Performed by: NURSE PRACTITIONER

## 2023-03-09 PROCEDURE — 73130 X-RAY EXAM OF HAND: CPT

## 2023-03-09 PROCEDURE — 36415 COLL VENOUS BLD VENIPUNCTURE: CPT | Performed by: NURSE PRACTITIONER

## 2023-03-09 PROCEDURE — 73610 X-RAY EXAM OF ANKLE: CPT

## 2023-03-09 PROCEDURE — 73110 X-RAY EXAM OF WRIST: CPT

## 2023-03-09 PROCEDURE — 83880 ASSAY OF NATRIURETIC PEPTIDE: CPT | Performed by: NURSE PRACTITIONER

## 2023-03-09 PROCEDURE — 99283 EMERGENCY DEPT VISIT LOW MDM: CPT

## 2023-03-09 PROCEDURE — 84439 ASSAY OF FREE THYROXINE: CPT | Performed by: NURSE PRACTITIONER

## 2023-03-09 PROCEDURE — 25010000002 DEXAMETHASONE PER 1 MG: Performed by: NURSE PRACTITIONER

## 2023-03-09 PROCEDURE — 86140 C-REACTIVE PROTEIN: CPT | Performed by: NURSE PRACTITIONER

## 2023-03-09 PROCEDURE — 84550 ASSAY OF BLOOD/URIC ACID: CPT | Performed by: NURSE PRACTITIONER

## 2023-03-09 PROCEDURE — 80053 COMPREHEN METABOLIC PANEL: CPT | Performed by: NURSE PRACTITIONER

## 2023-03-09 PROCEDURE — 85652 RBC SED RATE AUTOMATED: CPT | Performed by: NURSE PRACTITIONER

## 2023-03-09 PROCEDURE — 96374 THER/PROPH/DIAG INJ IV PUSH: CPT

## 2023-03-09 PROCEDURE — 73630 X-RAY EXAM OF FOOT: CPT

## 2023-03-09 PROCEDURE — 86038 ANTINUCLEAR ANTIBODIES: CPT | Performed by: NURSE PRACTITIONER

## 2023-03-09 RX ORDER — MELOXICAM 7.5 MG/1
7.5 TABLET ORAL DAILY
Qty: 7 TABLET | Refills: 0 | Status: SHIPPED | OUTPATIENT
Start: 2023-03-09

## 2023-03-09 RX ORDER — SODIUM CHLORIDE 0.9 % (FLUSH) 0.9 %
10 SYRINGE (ML) INJECTION AS NEEDED
Status: DISCONTINUED | OUTPATIENT
Start: 2023-03-09 | End: 2023-03-09 | Stop reason: HOSPADM

## 2023-03-09 RX ORDER — DEXAMETHASONE SODIUM PHOSPHATE 4 MG/ML
6 INJECTION, SOLUTION INTRA-ARTICULAR; INTRALESIONAL; INTRAMUSCULAR; INTRAVENOUS; SOFT TISSUE EVERY 6 HOURS
Status: DISCONTINUED | OUTPATIENT
Start: 2023-03-09 | End: 2023-03-09 | Stop reason: HOSPADM

## 2023-03-09 RX ADMIN — DEXAMETHASONE SODIUM PHOSPHATE 6 MG: 4 INJECTION, SOLUTION INTRAMUSCULAR; INTRAVENOUS at 12:22

## 2023-03-09 NOTE — DISCHARGE INSTRUCTIONS
Fill your prescription and take as directed. Keep your appointment with Dr. Jesus as scheduled on Tuesday. Return to the ER if your symptoms worsen or change in presentation.

## 2023-03-09 NOTE — ED PROVIDER NOTES
Subjective   History of Present Illness  Patient presents to the ER with complaints of pain and swelling to bilateral feet and hands.  She states this started about a month ago in which she was taking ibuprofen and Advil for.  She states about a week after her symptoms started she was diagnosed with bronchitis and placed on a steroid pack.  She states the steroid pack in turn helped her swelling and pain in her bilateral feet and hands.  She states that her pain and swelling has gradually started back and this morning it was too painful to walk on her feet.  She states the pain is greater on the right foot than the left foot.  She has an appointment with her primary care doctor Dr. Jesus on Tuesday.  Patient states she is concerned about arthritis.        Review of Systems   Musculoskeletal: Positive for joint swelling.       Past Medical History:   Diagnosis Date   • Acute bronchitis    • Acute pharyngitis    • Allergic rhinitis    • Cough    • Diabetes mellitus screening    • Dizziness and giddiness    • Encounter for long-term current use of medication    • Essential hypertension    • Fatigue    • History of bone density study     DEXA (bone density) test, peripheral   • History of mammography, diagnostic    • Nuclear senile cataract    • Otalgia    • Shoulder pain     bursitis vs tendonitis      • Sprain of ankle, left    • Upper respiratory infection    • Vertigo    • Wheezing        Allergies   Allergen Reactions   • Cefprozil Other (See Comments)     Made her feel funny in head        Past Surgical History:   Procedure Laterality Date   • CARDIAC CATHETERIZATION  04/16/2008    Cardiac cath (negative)   • COLONOSCOPY N/A 12/17/2020    Procedure: COLONOSCOPY;  Surgeon: Harsh Duggan DO;  Location: [facility] ENDOSCOPY;  Service: Gastroenterology;  Laterality: N/A;   • ENDOSCOPY N/A 3/9/2017    Procedure: ESOPHAGOGASTRODUODENOSCOPY possible dilation ;  Surgeon: Prince White MD;  Location: [facility]  "ENDOSCOPY;  Service:    • INJECTION OF MEDICATION  03/05/2013   • PAP SMEAR     • PELVIC FRACTURE SURGERY  1973    Anesth, repair fracture, pelvis   • TUBAL ABDOMINAL LIGATION         Family History   Problem Relation Age of Onset   • Asthma Mother    • Diabetes Mother    • Stroke Mother         cva in her 50's   • Cancer Father    • Diabetes Father    • Heart attack Father         mi in 50's   • No Known Problems Son        Social History     Socioeconomic History   • Marital status:    Tobacco Use   • Smoking status: Never   • Smokeless tobacco: Never   Substance and Sexual Activity   • Alcohol use: No   • Drug use: No   • Sexual activity: Defer           Objective    /67 (BP Location: Left arm, Patient Position: Sitting)   Pulse 73   Temp 97.8 °F (36.6 °C) (Oral)   Resp 16   Ht 152.4 cm (60\")   Wt 72.6 kg (160 lb)   LMP  (LMP Unknown)   SpO2 98%   BMI 31.25 kg/m²     Physical Exam  Constitutional:       Appearance: She is not ill-appearing.   HENT:      Head: Normocephalic and atraumatic.   Cardiovascular:      Rate and Rhythm: Normal rate and regular rhythm.      Pulses: Normal pulses.      Heart sounds: Normal heart sounds.   Pulmonary:      Effort: Pulmonary effort is normal.      Breath sounds: Normal breath sounds.   Abdominal:      General: There is no distension.      Palpations: Abdomen is soft.      Tenderness: There is no abdominal tenderness.   Musculoskeletal:         General: Swelling and tenderness present. No deformity or signs of injury. Normal range of motion.      Cervical back: Normal range of motion and neck supple.      Comments: Generalized swelling to bilateral hands and feet.  There is tenderness with palpation generalized over her joints.  There is no redness.  Strong pulses bilateral with normal color and warmth   Skin:     General: Skin is warm and dry.      Capillary Refill: Capillary refill takes less than 2 seconds.   Neurological:      Mental Status: She is " alert and oriented to person, place, and time.   Psychiatric:         Mood and Affect: Mood normal.         Behavior: Behavior normal.         Procedures  Results for orders placed or performed during the hospital encounter of 03/09/23   Comprehensive Metabolic Panel    Specimen: Blood   Result Value Ref Range    Glucose 113 (H) 65 - 99 mg/dL    BUN 13 8 - 23 mg/dL    Creatinine 0.60 0.57 - 1.00 mg/dL    Sodium 138 136 - 145 mmol/L    Potassium 4.0 3.5 - 5.2 mmol/L    Chloride 105 98 - 107 mmol/L    CO2 23.0 22.0 - 29.0 mmol/L    Calcium 9.9 8.6 - 10.5 mg/dL    Total Protein 7.1 6.0 - 8.5 g/dL    Albumin 4.0 3.5 - 5.2 g/dL    ALT (SGPT) 35 (H) 1 - 33 U/L    AST (SGOT) 32 1 - 32 U/L    Alkaline Phosphatase 83 39 - 117 U/L    Total Bilirubin 0.6 0.0 - 1.2 mg/dL    Globulin 3.1 gm/dL    A/G Ratio 1.3 g/dL    BUN/Creatinine Ratio 21.7 7.0 - 25.0    Anion Gap 10.0 5.0 - 15.0 mmol/L    eGFR 99.1 >60.0 mL/min/1.73   Urinalysis With Microscopic If Indicated (No Culture) - Urine, Clean Catch    Specimen: Urine, Clean Catch   Result Value Ref Range    Color, UA Yellow Yellow, Straw, Dark Yellow, Valarie    Appearance, UA Clear Clear    pH, UA 6.0 5.0 - 9.0    Specific Gravity, UA 1.007 1.003 - 1.030    Glucose, UA Negative Negative    Ketones, UA Negative Negative    Bilirubin, UA Negative Negative    Blood, UA Small (1+) (A) Negative    Protein, UA Negative Negative    Leuk Esterase, UA Negative Negative    Nitrite, UA Negative Negative    Urobilinogen, UA 0.2 E.U./dL 0.2 - 1.0 E.U./dL   BNP    Specimen: Blood   Result Value Ref Range    proBNP 90.3 0.0 - 900.0 pg/mL   C-reactive Protein    Specimen: Blood   Result Value Ref Range    C-Reactive Protein 4.23 (H) 0.00 - 0.50 mg/dL   Sedimentation Rate    Specimen: Blood   Result Value Ref Range    Sed Rate 30 0 - 30 mm/hr   TSH    Specimen: Blood   Result Value Ref Range    TSH 0.974 0.270 - 4.200 uIU/mL   T4, Free    Specimen: Blood   Result Value Ref Range    Free T4 1.18 0.93  - 1.70 ng/dL   Uric Acid    Specimen: Blood   Result Value Ref Range    Uric Acid 4.3 2.4 - 5.7 mg/dL   CBC Auto Differential    Specimen: Blood   Result Value Ref Range    WBC 12.94 (H) 3.40 - 10.80 10*3/mm3    RBC 3.86 3.77 - 5.28 10*6/mm3    Hemoglobin 12.1 12.0 - 15.9 g/dL    Hematocrit 35.4 34.0 - 46.6 %    MCV 91.7 79.0 - 97.0 fL    MCH 31.3 26.6 - 33.0 pg    MCHC 34.2 31.5 - 35.7 g/dL    RDW 12.7 12.3 - 15.4 %    RDW-SD 41.6 37.0 - 54.0 fl    MPV 9.3 6.0 - 12.0 fL    Platelets 347 140 - 450 10*3/mm3    Neutrophil % 86.2 (H) 42.7 - 76.0 %    Lymphocyte % 7.0 (L) 19.6 - 45.3 %    Monocyte % 6.0 5.0 - 12.0 %    Eosinophil % 0.2 (L) 0.3 - 6.2 %    Basophil % 0.2 0.0 - 1.5 %    Immature Grans % 0.4 0.0 - 0.5 %    Neutrophils, Absolute 11.15 (H) 1.70 - 7.00 10*3/mm3    Lymphocytes, Absolute 0.90 0.70 - 3.10 10*3/mm3    Monocytes, Absolute 0.78 0.10 - 0.90 10*3/mm3    Eosinophils, Absolute 0.03 0.00 - 0.40 10*3/mm3    Basophils, Absolute 0.03 0.00 - 0.20 10*3/mm3    Immature Grans, Absolute 0.05 0.00 - 0.05 10*3/mm3    nRBC 0.0 0.0 - 0.2 /100 WBC   Urinalysis, Microscopic Only - Urine, Clean Catch    Specimen: Urine, Clean Catch   Result Value Ref Range    RBC, UA 0-2 (A) None Seen /HPF    WBC, UA 0-2 None Seen, 0-2, 3-5 /HPF    Bacteria, UA None Seen None Seen /HPF    Squamous Epithelial Cells, UA 0-2 None Seen, 0-2 /HPF    Hyaline Casts, UA None Seen None Seen /LPF    Methodology Manual Light Microscopy    Light Blue Top   Result Value Ref Range    Extra Tube Hold for add-ons.      XR Ankle 3+ View Bilateral    Result Date: 3/9/2023  Narrative: Procedure:  Bilateral right and left ankles    Indication:  Pain and swelling   . Technique:  6 views   . Prior relevant exam:  None.     Impression: Findings, impression: Large bilateral plantar calcaneal spurs. The right lobe ankles are otherwise unremarkable. Electronically signed by:  Abhinav Funk MD  3/9/2023 10:50 AM [facility] Workstation: [id number]    XR Foot 3+ View  Bilateral    Result Date: 3/9/2023  Narrative: Procedure:  Bilateral right and left feet    Indication:  Pain and swelling   . Technique:  6 views   . Prior relevant exam:  None.     Impression: Findings, impression: Left foot: Hallux valgus deformity. Large plantar calcaneal spur. Osteophyte noted in the lateral view at the talonavicular articulation. Degenerative changes between the intermediate cuneiform and the second metatarsal. Left foot is otherwise unremarkable. Right foot: Mild degenerative changes first metatarsophalangeal joint. Large plantar calcaneal spur. Right foot is otherwise unremarkable. Electronically signed by:  Abhinav Funk MD  3/9/2023 10:48 AM CST Workstation: [id number]    XR Hand 3+ View Bilateral    Result Date: 3/9/2023  Narrative: Procedure:  Bilateral right and left hands.    Indication:  Pain and swelling   . Technique:  6 views   . Prior relevant exam:  None.   No evidence of acute bony, soft tissue, or joint abnormality is noted. Bone mineralization is within normal limits. The visualized joint spaces appear intact.     Impression: 1.  Normal right and left hands. Electronically signed by:  Abhinav Funk MD  3/9/2023 10:49 AM CST Workstation: [id number]    XR Wrist 3+ View Bilateral    Result Date: 3/9/2023  Narrative: Procedure:  Bilateral left and right wrist    Indication:  Swelling   . Technique:  8 views   . Prior relevant exam:  None.   No evidence of acute bony, soft tissue, or joint abnormality is noted. Bone mineralization is within normal limits. The visualized joint spaces appear intact. Normal right and left wrists.     Impression: 1.  Normal right and left wrists.   Electronically signed by:  Abhinav Funk MD  3/9/2023 10:45 AM CST Workstation: [id number]             ED Course                                           Medical Decision Making  Patient presents to the ER with complaints of bilateral pain and swelling to feet and hands only.  Denies chest pain or shortness of breath  or abdominal pain.  Pain and swelling improved several weeks back with use of a steroid pack.  Patient states the pain has increased today.  She has an appointment with her primary care provider on Tuesday.  Lab work shows an elevated CRP.  JEREMY is still pending.  Discussed arthritis with patient.  Will place on Mobic at this time.  Steroids provided today.  Patient to keep her appointment as scheduled on Tuesday or return back to the ER over the weekend if symptoms worsen or change presentation.    Arthralgia, unspecified joint: acute illness or injury  Amount and/or Complexity of Data Reviewed  Labs: ordered.  Radiology: ordered.      Risk  Prescription drug management.          Final diagnoses:   Arthralgia, unspecified joint       ED Disposition  ED Disposition     ED Disposition   Discharge    Condition   Stable    Comment   --             Anya Jesus MD  [address] [address]  [phone]      As scheduled on Tuesday.         Medication List      New Prescriptions    meloxicam 7.5 MG tablet  Commonly known as: MOBIC  Take 1 tablet by mouth Daily.           Where to Get Your Medications      These medications were sent to [facility]/pharmacy #[id number] - [place], [place] - [address] - [phone]  - [phone]   [address] [address]    Phone: [phone]   · meloxicam 7.5 MG tablet          Noelle Shen, KAYLI  03/09/23 0790

## 2023-03-10 LAB — ANA SER QL: NEGATIVE

## 2023-08-21 ENCOUNTER — APPOINTMENT (OUTPATIENT)
Dept: GENERAL RADIOLOGY | Facility: HOSPITAL | Age: 68
End: 2023-08-21
Payer: COMMERCIAL

## 2023-08-21 ENCOUNTER — HOSPITAL ENCOUNTER (EMERGENCY)
Facility: HOSPITAL | Age: 68
Discharge: HOME | End: 2023-08-21
Attending: STUDENT IN AN ORGANIZED HEALTH CARE EDUCATION/TRAINING PROGRAM | Admitting: STUDENT IN AN ORGANIZED HEALTH CARE EDUCATION/TRAINING PROGRAM
Payer: COMMERCIAL

## 2023-08-21 VITALS
HEIGHT: 60 IN | BODY MASS INDEX: 30.63 KG/M2 | OXYGEN SATURATION: 94 % | RESPIRATION RATE: 16 BRPM | DIASTOLIC BLOOD PRESSURE: 74 MMHG | HEART RATE: 75 BPM | TEMPERATURE: 97.7 F | WEIGHT: 156 LBS | SYSTOLIC BLOOD PRESSURE: 137 MMHG

## 2023-08-21 DIAGNOSIS — R53.83 FATIGUE, UNSPECIFIED TYPE: Primary | ICD-10-CM

## 2023-08-21 LAB
ALBUMIN SERPL-MCNC: 4.1 G/DL (ref 3.5–5.2)
ALBUMIN/GLOB SERPL: 1.2 G/DL
ALP SERPL-CCNC: 85 U/L (ref 39–117)
ALT SERPL W P-5'-P-CCNC: 19 U/L (ref 1–33)
ANION GAP SERPL CALCULATED.3IONS-SCNC: 13 MMOL/L (ref 5–15)
AST SERPL-CCNC: 22 U/L (ref 1–32)
BACTERIA UR QL AUTO: ABNORMAL /HPF
BASOPHILS # BLD AUTO: 0.03 10*3/MM3 (ref 0–0.2)
BASOPHILS NFR BLD AUTO: 0.3 % (ref 0–1.5)
BILIRUB SERPL-MCNC: 0.3 MG/DL (ref 0–1.2)
BILIRUB UR QL STRIP: NEGATIVE
BUN SERPL-MCNC: 9 MG/DL (ref 8–23)
BUN/CREAT SERPL: 12.2 (ref 7–25)
CALCIUM SPEC-SCNC: 9.5 MG/DL (ref 8.6–10.5)
CHLORIDE SERPL-SCNC: 104 MMOL/L (ref 98–107)
CLARITY UR: CLEAR
CO2 SERPL-SCNC: 21 MMOL/L (ref 22–29)
COLOR UR: ABNORMAL
CREAT SERPL-MCNC: 0.74 MG/DL (ref 0.57–1)
DEPRECATED RDW RBC AUTO: 45.7 FL (ref 37–54)
EGFRCR SERPLBLD CKD-EPI 2021: 89.4 ML/MIN/1.73
EOSINOPHIL # BLD AUTO: 0.02 10*3/MM3 (ref 0–0.4)
EOSINOPHIL NFR BLD AUTO: 0.2 % (ref 0.3–6.2)
ERYTHROCYTE [DISTWIDTH] IN BLOOD BY AUTOMATED COUNT: 13.2 % (ref 12.3–15.4)
GLOBULIN UR ELPH-MCNC: 3.4 GM/DL
GLUCOSE SERPL-MCNC: 111 MG/DL (ref 65–99)
GLUCOSE UR STRIP-MCNC: NEGATIVE MG/DL
HCT VFR BLD AUTO: 39.4 % (ref 34–46.6)
HGB BLD-MCNC: 13.4 G/DL (ref 12–15.9)
HGB UR QL STRIP.AUTO: ABNORMAL
HOLD SPECIMEN: NORMAL
HYALINE CASTS UR QL AUTO: ABNORMAL /LPF
IMM GRANULOCYTES # BLD AUTO: 0.04 10*3/MM3 (ref 0–0.05)
IMM GRANULOCYTES NFR BLD AUTO: 0.4 % (ref 0–0.5)
KETONES UR QL STRIP: NEGATIVE
LEUKOCYTE ESTERASE UR QL STRIP.AUTO: NEGATIVE
LYMPHOCYTES # BLD AUTO: 0.64 10*3/MM3 (ref 0.7–3.1)
LYMPHOCYTES NFR BLD AUTO: 6.8 % (ref 19.6–45.3)
MAGNESIUM SERPL-MCNC: 1.9 MG/DL (ref 1.6–2.4)
MCH RBC QN AUTO: 32.4 PG (ref 26.6–33)
MCHC RBC AUTO-ENTMCNC: 34 G/DL (ref 31.5–35.7)
MCV RBC AUTO: 95.4 FL (ref 79–97)
MONOCYTES # BLD AUTO: 0.49 10*3/MM3 (ref 0.1–0.9)
MONOCYTES NFR BLD AUTO: 5.2 % (ref 5–12)
NEUTROPHILS NFR BLD AUTO: 8.17 10*3/MM3 (ref 1.7–7)
NEUTROPHILS NFR BLD AUTO: 87.1 % (ref 42.7–76)
NITRITE UR QL STRIP: NEGATIVE
NRBC BLD AUTO-RTO: 0 /100 WBC (ref 0–0.2)
PH UR STRIP.AUTO: 6.5 [PH] (ref 5–9)
PLATELET # BLD AUTO: 345 10*3/MM3 (ref 140–450)
PMV BLD AUTO: 9.2 FL (ref 6–12)
POTASSIUM SERPL-SCNC: 3.7 MMOL/L (ref 3.5–5.2)
PROT SERPL-MCNC: 7.5 G/DL (ref 6–8.5)
PROT UR QL STRIP: NEGATIVE
RBC # BLD AUTO: 4.13 10*6/MM3 (ref 3.77–5.28)
RBC # UR STRIP: ABNORMAL /HPF
REF LAB TEST METHOD: ABNORMAL
SODIUM SERPL-SCNC: 138 MMOL/L (ref 136–145)
SP GR UR STRIP: 1 (ref 1–1.03)
SQUAMOUS #/AREA URNS HPF: ABNORMAL /HPF
UROBILINOGEN UR QL STRIP: ABNORMAL
WBC # UR STRIP: ABNORMAL /HPF
WBC NRBC COR # BLD: 9.39 10*3/MM3 (ref 3.4–10.8)
WHOLE BLOOD HOLD COAG: NORMAL

## 2023-08-21 PROCEDURE — 85025 COMPLETE CBC W/AUTO DIFF WBC: CPT

## 2023-08-21 PROCEDURE — 80053 COMPREHEN METABOLIC PANEL: CPT

## 2023-08-21 PROCEDURE — 71045 X-RAY EXAM CHEST 1 VIEW: CPT

## 2023-08-21 PROCEDURE — 36415 COLL VENOUS BLD VENIPUNCTURE: CPT

## 2023-08-21 PROCEDURE — 99283 EMERGENCY DEPT VISIT LOW MDM: CPT

## 2023-08-21 PROCEDURE — 83735 ASSAY OF MAGNESIUM: CPT | Performed by: STUDENT IN AN ORGANIZED HEALTH CARE EDUCATION/TRAINING PROGRAM

## 2023-08-21 PROCEDURE — 81001 URINALYSIS AUTO W/SCOPE: CPT | Performed by: STUDENT IN AN ORGANIZED HEALTH CARE EDUCATION/TRAINING PROGRAM

## 2023-08-21 NOTE — ED PROVIDER NOTES
Subjective   History of Present Illness  68-year-old female comes to the ER chief complaint of fatigue has been going on for the last several days.  She has had several bowel movements, but not diarrhea.  Patient will occasionally get a achy discomfort in her chest.  She does not have that currently.  Patient states she feels drained and does not have any energy.  Denies other symptoms.    History provided by:  Patient   used: No      Review of Systems   Constitutional:  Positive for activity change and fatigue. Negative for chills and fever.   HENT:  Negative for drooling.    Eyes:  Negative for redness.   Respiratory:  Negative for cough, chest tightness, shortness of breath and wheezing.    Cardiovascular:  Negative for chest pain and palpitations.   Gastrointestinal:  Negative for abdominal pain, nausea and vomiting.   Genitourinary:  Negative for flank pain.   Skin:  Negative for color change.   Neurological:  Negative for seizures.   Psychiatric/Behavioral:  Negative for confusion.      Past Medical History:   Diagnosis Date    Acute bronchitis     Acute pharyngitis     Allergic rhinitis     Cough     Diabetes mellitus screening     Dizziness and giddiness     Encounter for long-term current use of medication     Essential hypertension     Fatigue     History of bone density study     DEXA (bone density) test, peripheral    History of mammography, diagnostic     Nuclear senile cataract     Otalgia     Shoulder pain     bursitis vs tendonitis       Sprain of ankle, left     Upper respiratory infection     Vertigo     Wheezing        Allergies   Allergen Reactions    Cefprozil Other (See Comments)     Made her feel funny in head        Past Surgical History:   Procedure Laterality Date    CARDIAC CATHETERIZATION  04/16/2008    Cardiac cath (negative)    COLONOSCOPY N/A 12/17/2020    Procedure: COLONOSCOPY;  Surgeon: Harsh Duggan DO;  Location: [facility] ENDOSCOPY;  Service:  "Gastroenterology;  Laterality: N/A;    ENDOSCOPY N/A 3/9/2017    Procedure: ESOPHAGOGASTRODUODENOSCOPY possible dilation ;  Surgeon: Prince White MD;  Location: [facility] ENDOSCOPY;  Service:     INJECTION OF MEDICATION  03/05/2013    PAP SMEAR      PELVIC FRACTURE SURGERY  1973    Anesth, repair fracture, pelvis    TUBAL ABDOMINAL LIGATION         Family History   Problem Relation Age of Onset    Asthma Mother     Diabetes Mother     Stroke Mother         cva in her 50's    Cancer Father     Diabetes Father     Heart attack Father         mi in 50's    No Known Problems Son        Social History     Socioeconomic History    Marital status:    Tobacco Use    Smoking status: Never    Smokeless tobacco: Never   Substance and Sexual Activity    Alcohol use: No    Drug use: No    Sexual activity: Defer           Objective   Vitals:    08/21/23 1527 08/21/23 1815 08/21/23 1915   BP: 139/85 141/83 129/68   BP Location: Right arm Right arm    Patient Position: Sitting Lying    Pulse: 90 76 80   Resp: 20 18    Temp: 97.7 øF (36.5 øC)     TempSrc: Oral     SpO2: 96% 94% 95%   Weight: 70.8 kg (156 lb)     Height: 152.4 cm (60\")         Physical Exam  Vitals and nursing note reviewed.   Constitutional:       General: She is not in acute distress.     Appearance: She is well-developed. She is ill-appearing. She is not toxic-appearing or diaphoretic.   Eyes:      General: No scleral icterus.     Conjunctiva/sclera: Conjunctivae normal.   Pulmonary:      Effort: Pulmonary effort is normal. No accessory muscle usage or respiratory distress.   Chest:      Chest wall: No tenderness.   Abdominal:      Palpations: Abdomen is soft.      Tenderness: There is no abdominal tenderness (deep palpation). There is no guarding or rebound.   Skin:     General: Skin is warm and dry.      Capillary Refill: Capillary refill takes less than 2 seconds.   Neurological:      Mental Status: She is alert and oriented to person, place, and " time.       Procedures           ED Course      Results for orders placed or performed during the hospital encounter of 08/21/23   Comprehensive Metabolic Panel    Specimen: Blood   Result Value Ref Range    Glucose 111 (H) 65 - 99 mg/dL    BUN 9 8 - 23 mg/dL    Creatinine 0.74 0.57 - 1.00 mg/dL    Sodium 138 136 - 145 mmol/L    Potassium 3.7 3.5 - 5.2 mmol/L    Chloride 104 98 - 107 mmol/L    CO2 21.0 (L) 22.0 - 29.0 mmol/L    Calcium 9.5 8.6 - 10.5 mg/dL    Total Protein 7.5 6.0 - 8.5 g/dL    Albumin 4.1 3.5 - 5.2 g/dL    ALT (SGPT) 19 1 - 33 U/L    AST (SGOT) 22 1 - 32 U/L    Alkaline Phosphatase 85 39 - 117 U/L    Total Bilirubin 0.3 0.0 - 1.2 mg/dL    Globulin 3.4 gm/dL    A/G Ratio 1.2 g/dL    BUN/Creatinine Ratio 12.2 7.0 - 25.0    Anion Gap 13.0 5.0 - 15.0 mmol/L    eGFR 89.4 >60.0 mL/min/1.73   CBC Auto Differential    Specimen: Blood   Result Value Ref Range    WBC 9.39 3.40 - 10.80 10*3/mm3    RBC 4.13 3.77 - 5.28 10*6/mm3    Hemoglobin 13.4 12.0 - 15.9 g/dL    Hematocrit 39.4 34.0 - 46.6 %    MCV 95.4 79.0 - 97.0 fL    MCH 32.4 26.6 - 33.0 pg    MCHC 34.0 31.5 - 35.7 g/dL    RDW 13.2 12.3 - 15.4 %    RDW-SD 45.7 37.0 - 54.0 fl    MPV 9.2 6.0 - 12.0 fL    Platelets 345 140 - 450 10*3/mm3    Neutrophil % 87.1 (H) 42.7 - 76.0 %    Lymphocyte % 6.8 (L) 19.6 - 45.3 %    Monocyte % 5.2 5.0 - 12.0 %    Eosinophil % 0.2 (L) 0.3 - 6.2 %    Basophil % 0.3 0.0 - 1.5 %    Immature Grans % 0.4 0.0 - 0.5 %    Neutrophils, Absolute 8.17 (H) 1.70 - 7.00 10*3/mm3    Lymphocytes, Absolute 0.64 (L) 0.70 - 3.10 10*3/mm3    Monocytes, Absolute 0.49 0.10 - 0.90 10*3/mm3    Eosinophils, Absolute 0.02 0.00 - 0.40 10*3/mm3    Basophils, Absolute 0.03 0.00 - 0.20 10*3/mm3    Immature Grans, Absolute 0.04 0.00 - 0.05 10*3/mm3    nRBC 0.0 0.0 - 0.2 /100 WBC   Urinalysis With Microscopic If Indicated (No Culture) - Urine, Clean Catch    Specimen: Urine, Clean Catch   Result Value Ref Range    Color, UA Straw Yellow, Straw, Dark  Yellow, Val[place]    Appearance, UA Clear Clear    pH, UA 6.5 5.0 - 9.0    Specific Gravity, UA 1.002 (L) 1.003 - 1.030    Glucose, UA Negative Negative    Ketones, UA Negative Negative    Bilirubin, UA Negative Negative    Blood, UA Small (1+) (A) Negative    Protein, UA Negative Negative    Leuk Esterase, UA Negative Negative    Nitrite, UA Negative Negative    Urobilinogen, UA 0.2 E.U./dL 0.2 - 1.0 E.U./dL   Magnesium    Specimen: Blood   Result Value Ref Range    Magnesium 1.9 1.6 - 2.4 mg/dL   Urinalysis, Microscopic Only - Urine, Clean Catch    Specimen: Urine, Clean Catch   Result Value Ref Range    RBC, UA 0-2 (A) None Seen /HPF    WBC, UA 0-2 None Seen, 0-2, 3-5 /HPF    Bacteria, UA None Seen None Seen /HPF    Squamous Epithelial Cells, UA 0-2 None Seen, 0-2 /HPF    Hyaline Casts, UA None Seen None Seen /LPF    Methodology Automated Microscopy    Light Blue Top   Result Value Ref Range    Extra Tube Hold for add-ons.      XR Chest 1 View                          Medical Decision Making  Vital signs are stable, afebrile.  Labs are unremarkable.  Chest x-ray shows no acute cardiopulmonary process.  Neurologically intact.  Recommend follow-up with her PCP.  Return precautions given.    Problems Addressed:  Fatigue, unspecified type: complicated acute illness or injury    Amount and/or Complexity of Data Reviewed  Radiology: ordered.        Final diagnoses:   Fatigue, unspecified type       ED Disposition  ED Disposition       ED Disposition   Discharge    Condition   Stable    Comment   --               Anya Jesus MD  [address] [address]  [phone]    Schedule an appointment as soon as possible for a visit in 2 days  As needed, ER follow up         Medication List      No changes were made to your prescriptions during this visit.            Nilesh Zavala MD  08/21/23 3076

## (undated) DEVICE — CANN SMPL SOFTECH BIFLO ETCO2 A/M 7FT

## (undated) DEVICE — SINGLE-USE BIOPSY FORCEPS: Brand: RADIAL JAW 4

## (undated) DEVICE — BITEBLOCK ENDO W/STRAP 60F A/ LF DISP